# Patient Record
Sex: FEMALE | Race: WHITE | NOT HISPANIC OR LATINO | Employment: FULL TIME | URBAN - METROPOLITAN AREA
[De-identification: names, ages, dates, MRNs, and addresses within clinical notes are randomized per-mention and may not be internally consistent; named-entity substitution may affect disease eponyms.]

---

## 2018-11-28 ENCOUNTER — APPOINTMENT (OUTPATIENT)
Dept: GENERAL RADIOLOGY | Facility: HOSPITAL | Age: 53
End: 2018-11-28

## 2018-11-28 ENCOUNTER — HOSPITAL ENCOUNTER (INPATIENT)
Facility: HOSPITAL | Age: 53
LOS: 2 days | Discharge: HOME | End: 2018-11-30
Attending: EMERGENCY MEDICINE | Admitting: HOSPITALIST

## 2018-11-28 ENCOUNTER — APPOINTMENT (OUTPATIENT)
Dept: CT IMAGING | Facility: HOSPITAL | Age: 53
End: 2018-11-28

## 2018-11-28 DIAGNOSIS — J44.1 COPD EXACERBATION (HCC): Primary | ICD-10-CM

## 2018-11-28 DIAGNOSIS — R91.8 PULMONARY NODULES: ICD-10-CM

## 2018-11-28 DIAGNOSIS — J06.9 UPPER RESPIRATORY TRACT INFECTION, UNSPECIFIED TYPE: ICD-10-CM

## 2018-11-28 DIAGNOSIS — R09.02 HYPOXIA: ICD-10-CM

## 2018-11-28 LAB
ALBUMIN SERPL-MCNC: 3.9 G/DL (ref 3.5–5.2)
ALBUMIN/GLOB SERPL: 1.1 G/DL
ALP SERPL-CCNC: 58 U/L (ref 40–129)
ALT SERPL W P-5'-P-CCNC: 10 U/L (ref 5–33)
ANION GAP SERPL CALCULATED.3IONS-SCNC: 12.7 MMOL/L
AST SERPL-CCNC: 13 U/L (ref 5–32)
BASOPHILS # BLD AUTO: 0.05 10*3/MM3 (ref 0–0.2)
BASOPHILS NFR BLD AUTO: 0.7 % (ref 0–2)
BILIRUB SERPL-MCNC: 0.5 MG/DL (ref 0.2–1.2)
BUN BLD-MCNC: 7 MG/DL (ref 6–20)
BUN/CREAT SERPL: 13.5 (ref 7–25)
CALCIUM SPEC-SCNC: 9.1 MG/DL (ref 8.6–10.5)
CHLORIDE SERPL-SCNC: 98 MMOL/L (ref 98–107)
CO2 SERPL-SCNC: 27.3 MMOL/L (ref 22–29)
CREAT BLD-MCNC: 0.52 MG/DL (ref 0.57–1)
D DIMER PPP FEU-MCNC: 0.69 MCGFEU/ML (ref 0–0.46)
DEPRECATED RDW RBC AUTO: 42.4 FL (ref 37–54)
EOSINOPHIL # BLD AUTO: 0.12 10*3/MM3 (ref 0.1–0.3)
EOSINOPHIL NFR BLD AUTO: 1.6 % (ref 0–4)
ERYTHROCYTE [DISTWIDTH] IN BLOOD BY AUTOMATED COUNT: 12.7 % (ref 11.5–14.5)
GFR SERPL CREATININE-BSD FRML MDRD: 124 ML/MIN/1.73
GLOBULIN UR ELPH-MCNC: 3.5 GM/DL
GLUCOSE BLD-MCNC: 101 MG/DL (ref 65–99)
HCT VFR BLD AUTO: 48.2 % (ref 37–47)
HGB BLD-MCNC: 16.3 G/DL (ref 12–16)
IMM GRANULOCYTES # BLD: 0.01 10*3/MM3 (ref 0–0.03)
IMM GRANULOCYTES NFR BLD: 0.1 % (ref 0–0.5)
LYMPHOCYTES # BLD AUTO: 2.38 10*3/MM3 (ref 0.6–4.8)
LYMPHOCYTES NFR BLD AUTO: 32.2 % (ref 20–45)
MAGNESIUM SERPL-MCNC: 2.1 MG/DL (ref 1.7–2.5)
MCH RBC QN AUTO: 30.6 PG (ref 27–31)
MCHC RBC AUTO-ENTMCNC: 33.8 G/DL (ref 31–37)
MCV RBC AUTO: 90.6 FL (ref 81–99)
MONOCYTES # BLD AUTO: 1.12 10*3/MM3 (ref 0–1)
MONOCYTES NFR BLD AUTO: 15.2 % (ref 3–8)
NEUTROPHILS # BLD AUTO: 3.7 10*3/MM3 (ref 1.5–8.3)
NEUTROPHILS NFR BLD AUTO: 50.2 % (ref 45–70)
NRBC BLD MANUAL-RTO: 0 /100 WBC (ref 0–0)
NT-PROBNP SERPL-MCNC: 52.4 PG/ML (ref 5–125)
PLATELET # BLD AUTO: 167 10*3/MM3 (ref 140–500)
PMV BLD AUTO: 8.8 FL (ref 7.4–10.4)
POTASSIUM BLD-SCNC: 3.3 MMOL/L (ref 3.5–5.2)
PROCALCITONIN SERPL-MCNC: <0.02 NG/ML (ref 0.1–0.25)
PROT SERPL-MCNC: 7.4 G/DL (ref 6–8.5)
RBC # BLD AUTO: 5.32 10*6/MM3 (ref 4.2–5.4)
SODIUM BLD-SCNC: 138 MMOL/L (ref 136–145)
TROPONIN T SERPL-MCNC: <0.01 NG/ML (ref 0–0.03)
WBC NRBC COR # BLD: 7.38 10*3/MM3 (ref 4.8–10.8)

## 2018-11-28 PROCEDURE — 94799 UNLISTED PULMONARY SVC/PX: CPT

## 2018-11-28 PROCEDURE — 25010000002 AZITHROMYCIN: Performed by: EMERGENCY MEDICINE

## 2018-11-28 PROCEDURE — 87633 RESP VIRUS 12-25 TARGETS: CPT | Performed by: HOSPITALIST

## 2018-11-28 PROCEDURE — 80053 COMPREHEN METABOLIC PANEL: CPT | Performed by: EMERGENCY MEDICINE

## 2018-11-28 PROCEDURE — 25010000002 ENOXAPARIN PER 10 MG: Performed by: HOSPITALIST

## 2018-11-28 PROCEDURE — 84484 ASSAY OF TROPONIN QUANT: CPT | Performed by: EMERGENCY MEDICINE

## 2018-11-28 PROCEDURE — 87070 CULTURE OTHR SPECIMN AEROBIC: CPT | Performed by: HOSPITALIST

## 2018-11-28 PROCEDURE — 87798 DETECT AGENT NOS DNA AMP: CPT | Performed by: HOSPITALIST

## 2018-11-28 PROCEDURE — 85379 FIBRIN DEGRADATION QUANT: CPT | Performed by: EMERGENCY MEDICINE

## 2018-11-28 PROCEDURE — 71046 X-RAY EXAM CHEST 2 VIEWS: CPT

## 2018-11-28 PROCEDURE — 0 IOPAMIDOL PER 1 ML: Performed by: EMERGENCY MEDICINE

## 2018-11-28 PROCEDURE — 99284 EMERGENCY DEPT VISIT MOD MDM: CPT | Performed by: EMERGENCY MEDICINE

## 2018-11-28 PROCEDURE — 85025 COMPLETE CBC W/AUTO DIFF WBC: CPT | Performed by: EMERGENCY MEDICINE

## 2018-11-28 PROCEDURE — 87205 SMEAR GRAM STAIN: CPT | Performed by: HOSPITALIST

## 2018-11-28 PROCEDURE — 84145 PROCALCITONIN (PCT): CPT | Performed by: HOSPITALIST

## 2018-11-28 PROCEDURE — 87581 M.PNEUMON DNA AMP PROBE: CPT | Performed by: HOSPITALIST

## 2018-11-28 PROCEDURE — 93005 ELECTROCARDIOGRAM TRACING: CPT | Performed by: EMERGENCY MEDICINE

## 2018-11-28 PROCEDURE — 87486 CHLMYD PNEUM DNA AMP PROBE: CPT | Performed by: HOSPITALIST

## 2018-11-28 PROCEDURE — 83880 ASSAY OF NATRIURETIC PEPTIDE: CPT | Performed by: EMERGENCY MEDICINE

## 2018-11-28 PROCEDURE — 99222 1ST HOSP IP/OBS MODERATE 55: CPT | Performed by: HOSPITALIST

## 2018-11-28 PROCEDURE — 93010 ELECTROCARDIOGRAM REPORT: CPT | Performed by: INTERNAL MEDICINE

## 2018-11-28 PROCEDURE — 94640 AIRWAY INHALATION TREATMENT: CPT

## 2018-11-28 PROCEDURE — 71275 CT ANGIOGRAPHY CHEST: CPT

## 2018-11-28 PROCEDURE — 25010000002 METHYLPREDNISOLONE PER 40 MG: Performed by: HOSPITALIST

## 2018-11-28 PROCEDURE — 83735 ASSAY OF MAGNESIUM: CPT | Performed by: HOSPITALIST

## 2018-11-28 PROCEDURE — 99285 EMERGENCY DEPT VISIT HI MDM: CPT

## 2018-11-28 PROCEDURE — 25010000002 METHYLPREDNISOLONE PER 125 MG: Performed by: EMERGENCY MEDICINE

## 2018-11-28 RX ORDER — POTASSIUM CHLORIDE 7.45 MG/ML
10 INJECTION INTRAVENOUS
Status: DISCONTINUED | OUTPATIENT
Start: 2018-11-28 | End: 2018-11-30

## 2018-11-28 RX ORDER — IPRATROPIUM BROMIDE AND ALBUTEROL SULFATE 2.5; .5 MG/3ML; MG/3ML
3 SOLUTION RESPIRATORY (INHALATION) ONCE
Status: COMPLETED | OUTPATIENT
Start: 2018-11-28 | End: 2018-11-28

## 2018-11-28 RX ORDER — ACETAMINOPHEN 325 MG/1
650 TABLET ORAL EVERY 4 HOURS PRN
Status: DISCONTINUED | OUTPATIENT
Start: 2018-11-28 | End: 2018-11-30 | Stop reason: HOSPADM

## 2018-11-28 RX ORDER — POTASSIUM CHLORIDE 1.5 G/1.77G
40 POWDER, FOR SOLUTION ORAL AS NEEDED
Status: DISCONTINUED | OUTPATIENT
Start: 2018-11-28 | End: 2018-11-30

## 2018-11-28 RX ORDER — SODIUM CHLORIDE 9 MG/ML
40 INJECTION, SOLUTION INTRAVENOUS AS NEEDED
Status: DISCONTINUED | OUTPATIENT
Start: 2018-11-28 | End: 2018-11-30

## 2018-11-28 RX ORDER — METHYLPREDNISOLONE SODIUM SUCCINATE 40 MG/ML
40 INJECTION, POWDER, LYOPHILIZED, FOR SOLUTION INTRAMUSCULAR; INTRAVENOUS EVERY 6 HOURS
Status: DISCONTINUED | OUTPATIENT
Start: 2018-11-28 | End: 2018-11-28

## 2018-11-28 RX ORDER — ALBUTEROL SULFATE 2.5 MG/3ML
2.5 SOLUTION RESPIRATORY (INHALATION) ONCE
Status: COMPLETED | OUTPATIENT
Start: 2018-11-28 | End: 2018-11-28

## 2018-11-28 RX ORDER — METHYLPREDNISOLONE SODIUM SUCCINATE 125 MG/2ML
125 INJECTION, POWDER, LYOPHILIZED, FOR SOLUTION INTRAMUSCULAR; INTRAVENOUS ONCE
Status: COMPLETED | OUTPATIENT
Start: 2018-11-28 | End: 2018-11-28

## 2018-11-28 RX ORDER — MAGNESIUM SULFATE 1 G/100ML
1 INJECTION INTRAVENOUS AS NEEDED
Status: DISCONTINUED | OUTPATIENT
Start: 2018-11-28 | End: 2018-11-30

## 2018-11-28 RX ORDER — MAGNESIUM SULFATE HEPTAHYDRATE 40 MG/ML
4 INJECTION, SOLUTION INTRAVENOUS AS NEEDED
Status: DISCONTINUED | OUTPATIENT
Start: 2018-11-28 | End: 2018-11-30

## 2018-11-28 RX ORDER — AZITHROMYCIN 250 MG/1
500 TABLET, FILM COATED ORAL
Status: DISCONTINUED | OUTPATIENT
Start: 2018-11-29 | End: 2018-11-30 | Stop reason: HOSPADM

## 2018-11-28 RX ORDER — IPRATROPIUM BROMIDE AND ALBUTEROL SULFATE 2.5; .5 MG/3ML; MG/3ML
3 SOLUTION RESPIRATORY (INHALATION)
Status: DISCONTINUED | OUTPATIENT
Start: 2018-11-28 | End: 2018-11-30 | Stop reason: HOSPADM

## 2018-11-28 RX ORDER — GUAIFENESIN 600 MG/1
600 TABLET, EXTENDED RELEASE ORAL EVERY 12 HOURS SCHEDULED
Status: DISCONTINUED | OUTPATIENT
Start: 2018-11-29 | End: 2018-11-30 | Stop reason: HOSPADM

## 2018-11-28 RX ORDER — POTASSIUM CHLORIDE 20 MEQ/1
40 TABLET, EXTENDED RELEASE ORAL AS NEEDED
Status: DISCONTINUED | OUTPATIENT
Start: 2018-11-28 | End: 2018-11-30

## 2018-11-28 RX ORDER — SODIUM CHLORIDE 0.9 % (FLUSH) 0.9 %
3 SYRINGE (ML) INJECTION EVERY 12 HOURS SCHEDULED
Status: DISCONTINUED | OUTPATIENT
Start: 2018-11-28 | End: 2018-11-30

## 2018-11-28 RX ORDER — SODIUM CHLORIDE 0.9 % (FLUSH) 0.9 %
3-10 SYRINGE (ML) INJECTION AS NEEDED
Status: DISCONTINUED | OUTPATIENT
Start: 2018-11-28 | End: 2018-11-30

## 2018-11-28 RX ORDER — MAGNESIUM SULFATE HEPTAHYDRATE 40 MG/ML
2 INJECTION, SOLUTION INTRAVENOUS AS NEEDED
Status: DISCONTINUED | OUTPATIENT
Start: 2018-11-28 | End: 2018-11-30

## 2018-11-28 RX ADMIN — ALBUTEROL SULFATE 2.5 MG: 2.5 SOLUTION RESPIRATORY (INHALATION) at 15:39

## 2018-11-28 RX ADMIN — IPRATROPIUM BROMIDE AND ALBUTEROL SULFATE 3 ML: .5; 3 SOLUTION RESPIRATORY (INHALATION) at 23:27

## 2018-11-28 RX ADMIN — AZITHROMYCIN MONOHYDRATE 500 MG: 500 INJECTION, POWDER, LYOPHILIZED, FOR SOLUTION INTRAVENOUS at 18:42

## 2018-11-28 RX ADMIN — METHYLPREDNISOLONE SODIUM SUCCINATE 40 MG: 40 INJECTION, POWDER, FOR SOLUTION INTRAMUSCULAR; INTRAVENOUS at 21:14

## 2018-11-28 RX ADMIN — SODIUM CHLORIDE 1000 ML: 9 INJECTION, SOLUTION INTRAVENOUS at 16:21

## 2018-11-28 RX ADMIN — IOPAMIDOL 100 ML: 755 INJECTION, SOLUTION INTRAVENOUS at 17:24

## 2018-11-28 RX ADMIN — METHYLPREDNISOLONE SODIUM SUCCINATE 125 MG: 125 INJECTION, POWDER, FOR SOLUTION INTRAMUSCULAR; INTRAVENOUS at 15:18

## 2018-11-28 RX ADMIN — ALBUTEROL SULFATE 2.5 MG: 2.5 SOLUTION RESPIRATORY (INHALATION) at 16:21

## 2018-11-28 RX ADMIN — IPRATROPIUM BROMIDE AND ALBUTEROL SULFATE 3 ML: .5; 3 SOLUTION RESPIRATORY (INHALATION) at 15:04

## 2018-11-28 RX ADMIN — SODIUM CHLORIDE, PRESERVATIVE FREE 3 ML: 5 INJECTION INTRAVENOUS at 21:14

## 2018-11-28 RX ADMIN — ENOXAPARIN SODIUM 40 MG: 40 INJECTION SUBCUTANEOUS at 21:13

## 2018-11-28 RX ADMIN — IPRATROPIUM BROMIDE AND ALBUTEROL SULFATE 3 ML: .5; 3 SOLUTION RESPIRATORY (INHALATION) at 20:44

## 2018-11-29 LAB
ANION GAP SERPL CALCULATED.3IONS-SCNC: 14.6 MMOL/L
B PERT DNA SPEC QL NAA+PROBE: NOT DETECTED
BASOPHILS # BLD AUTO: 0 10*3/MM3 (ref 0–0.2)
BASOPHILS NFR BLD AUTO: 0 % (ref 0–2)
BUN BLD-MCNC: 9 MG/DL (ref 6–20)
BUN/CREAT SERPL: 17 (ref 7–25)
C PNEUM DNA NPH QL NAA+NON-PROBE: NOT DETECTED
CALCIUM SPEC-SCNC: 8.7 MG/DL (ref 8.6–10.5)
CHLORIDE SERPL-SCNC: 103 MMOL/L (ref 98–107)
CO2 SERPL-SCNC: 22.4 MMOL/L (ref 22–29)
CREAT BLD-MCNC: 0.53 MG/DL (ref 0.57–1)
DEPRECATED RDW RBC AUTO: 42.4 FL (ref 37–54)
EOSINOPHIL # BLD AUTO: 0 10*3/MM3 (ref 0.1–0.3)
EOSINOPHIL NFR BLD AUTO: 0 % (ref 0–4)
ERYTHROCYTE [DISTWIDTH] IN BLOOD BY AUTOMATED COUNT: 12.6 % (ref 11.5–14.5)
FLUAV H1 2009 PAND RNA NPH QL NAA+PROBE: NOT DETECTED
FLUAV H1 HA GENE NPH QL NAA+PROBE: NOT DETECTED
FLUAV H3 RNA NPH QL NAA+PROBE: NOT DETECTED
FLUAV SUBTYP SPEC NAA+PROBE: NOT DETECTED
FLUBV RNA ISLT QL NAA+PROBE: NOT DETECTED
GFR SERPL CREATININE-BSD FRML MDRD: 121 ML/MIN/1.73
GLUCOSE BLD-MCNC: 209 MG/DL (ref 65–99)
HADV DNA SPEC NAA+PROBE: NOT DETECTED
HCOV 229E RNA SPEC QL NAA+PROBE: NOT DETECTED
HCOV HKU1 RNA SPEC QL NAA+PROBE: NOT DETECTED
HCOV NL63 RNA SPEC QL NAA+PROBE: NOT DETECTED
HCOV OC43 RNA SPEC QL NAA+PROBE: DETECTED
HCT VFR BLD AUTO: 45.2 % (ref 37–47)
HGB BLD-MCNC: 14.7 G/DL (ref 12–16)
HMPV RNA NPH QL NAA+NON-PROBE: NOT DETECTED
HPIV1 RNA SPEC QL NAA+PROBE: NOT DETECTED
HPIV2 RNA SPEC QL NAA+PROBE: NOT DETECTED
HPIV3 RNA NPH QL NAA+PROBE: NOT DETECTED
HPIV4 P GENE NPH QL NAA+PROBE: NOT DETECTED
IMM GRANULOCYTES # BLD: 0.01 10*3/MM3 (ref 0–0.03)
IMM GRANULOCYTES NFR BLD: 0.3 % (ref 0–0.5)
LYMPHOCYTES # BLD AUTO: 0.71 10*3/MM3 (ref 0.6–4.8)
LYMPHOCYTES NFR BLD AUTO: 20.9 % (ref 20–45)
M PNEUMO IGG SER IA-ACNC: NOT DETECTED
MCH RBC QN AUTO: 29.8 PG (ref 27–31)
MCHC RBC AUTO-ENTMCNC: 32.5 G/DL (ref 31–37)
MCV RBC AUTO: 91.5 FL (ref 81–99)
MONOCYTES # BLD AUTO: 0.15 10*3/MM3 (ref 0–1)
MONOCYTES NFR BLD AUTO: 4.4 % (ref 3–8)
NEUTROPHILS # BLD AUTO: 2.53 10*3/MM3 (ref 1.5–8.3)
NEUTROPHILS NFR BLD AUTO: 74.4 % (ref 45–70)
NRBC BLD MANUAL-RTO: 0 /100 WBC (ref 0–0)
PLAT MORPH BLD: NORMAL
PLATELET # BLD AUTO: 159 10*3/MM3 (ref 140–500)
PMV BLD AUTO: 9.3 FL (ref 7.4–10.4)
POTASSIUM BLD-SCNC: 3.4 MMOL/L (ref 3.5–5.2)
POTASSIUM BLD-SCNC: 4 MMOL/L (ref 3.5–5.2)
RBC # BLD AUTO: 4.94 10*6/MM3 (ref 4.2–5.4)
RBC MORPH BLD: NORMAL
RHINOVIRUS RNA SPEC NAA+PROBE: NOT DETECTED
RSV RNA NPH QL NAA+NON-PROBE: NOT DETECTED
SODIUM BLD-SCNC: 140 MMOL/L (ref 136–145)
WBC MORPH BLD: NORMAL
WBC NRBC COR # BLD: 3.4 10*3/MM3 (ref 4.8–10.8)

## 2018-11-29 PROCEDURE — 63710000001 PREDNISONE PER 5 MG: Performed by: HOSPITALIST

## 2018-11-29 PROCEDURE — 94799 UNLISTED PULMONARY SVC/PX: CPT

## 2018-11-29 PROCEDURE — 85025 COMPLETE CBC W/AUTO DIFF WBC: CPT | Performed by: HOSPITALIST

## 2018-11-29 PROCEDURE — 84132 ASSAY OF SERUM POTASSIUM: CPT | Performed by: HOSPITALIST

## 2018-11-29 PROCEDURE — 99232 SBSQ HOSP IP/OBS MODERATE 35: CPT | Performed by: HOSPITALIST

## 2018-11-29 PROCEDURE — 63710000001 PREDNISONE PER 1 MG: Performed by: HOSPITALIST

## 2018-11-29 PROCEDURE — 80048 BASIC METABOLIC PNL TOTAL CA: CPT | Performed by: HOSPITALIST

## 2018-11-29 PROCEDURE — 85007 BL SMEAR W/DIFF WBC COUNT: CPT | Performed by: HOSPITALIST

## 2018-11-29 RX ADMIN — SODIUM CHLORIDE, PRESERVATIVE FREE 3 ML: 5 INJECTION INTRAVENOUS at 20:10

## 2018-11-29 RX ADMIN — GUAIFENESIN 600 MG: 600 TABLET, EXTENDED RELEASE ORAL at 00:29

## 2018-11-29 RX ADMIN — PREDNISONE 30 MG: 20 TABLET ORAL at 08:57

## 2018-11-29 RX ADMIN — IPRATROPIUM BROMIDE AND ALBUTEROL SULFATE 3 ML: .5; 3 SOLUTION RESPIRATORY (INHALATION) at 07:24

## 2018-11-29 RX ADMIN — GUAIFENESIN 600 MG: 600 TABLET, EXTENDED RELEASE ORAL at 20:00

## 2018-11-29 RX ADMIN — IPRATROPIUM BROMIDE AND ALBUTEROL SULFATE 3 ML: .5; 3 SOLUTION RESPIRATORY (INHALATION) at 15:31

## 2018-11-29 RX ADMIN — GUAIFENESIN 600 MG: 600 TABLET, EXTENDED RELEASE ORAL at 08:57

## 2018-11-29 RX ADMIN — PREDNISONE 30 MG: 20 TABLET ORAL at 18:08

## 2018-11-29 RX ADMIN — IPRATROPIUM BROMIDE AND ALBUTEROL SULFATE 3 ML: .5; 3 SOLUTION RESPIRATORY (INHALATION) at 11:25

## 2018-11-29 RX ADMIN — POTASSIUM CHLORIDE 40 MEQ: 1500 TABLET, EXTENDED RELEASE ORAL at 00:29

## 2018-11-29 RX ADMIN — IPRATROPIUM BROMIDE AND ALBUTEROL SULFATE 3 ML: .5; 3 SOLUTION RESPIRATORY (INHALATION) at 19:30

## 2018-11-29 RX ADMIN — POTASSIUM CHLORIDE 40 MEQ: 1500 TABLET, EXTENDED RELEASE ORAL at 04:18

## 2018-11-29 RX ADMIN — IPRATROPIUM BROMIDE AND ALBUTEROL SULFATE 3 ML: .5; 3 SOLUTION RESPIRATORY (INHALATION) at 03:05

## 2018-11-29 RX ADMIN — SODIUM CHLORIDE, PRESERVATIVE FREE 3 ML: 5 INJECTION INTRAVENOUS at 08:57

## 2018-11-29 RX ADMIN — AZITHROMYCIN MONOHYDRATE 500 MG: 250 TABLET ORAL at 08:57

## 2018-11-29 RX ADMIN — IPRATROPIUM BROMIDE AND ALBUTEROL SULFATE 3 ML: .5; 3 SOLUTION RESPIRATORY (INHALATION) at 23:22

## 2018-11-30 VITALS
TEMPERATURE: 97.8 F | HEART RATE: 96 BPM | SYSTOLIC BLOOD PRESSURE: 98 MMHG | BODY MASS INDEX: 25.87 KG/M2 | DIASTOLIC BLOOD PRESSURE: 62 MMHG | WEIGHT: 140.56 LBS | OXYGEN SATURATION: 93 % | HEIGHT: 62 IN | RESPIRATION RATE: 20 BRPM

## 2018-11-30 PROCEDURE — 94618 PULMONARY STRESS TESTING: CPT

## 2018-11-30 PROCEDURE — 25010000002 ENOXAPARIN PER 10 MG: Performed by: HOSPITALIST

## 2018-11-30 PROCEDURE — 94799 UNLISTED PULMONARY SVC/PX: CPT

## 2018-11-30 PROCEDURE — 63710000001 PREDNISONE PER 1 MG: Performed by: HOSPITALIST

## 2018-11-30 PROCEDURE — 63710000001 PREDNISONE PER 5 MG: Performed by: HOSPITALIST

## 2018-11-30 PROCEDURE — 99239 HOSP IP/OBS DSCHRG MGMT >30: CPT | Performed by: HOSPITALIST

## 2018-11-30 RX ORDER — PREDNISONE 10 MG/1
20 TABLET ORAL 2 TIMES DAILY
Qty: 20 TABLET | Refills: 0 | Status: SHIPPED | OUTPATIENT
Start: 2018-11-30 | End: 2018-12-05

## 2018-11-30 RX ORDER — AZITHROMYCIN 250 MG/1
TABLET, FILM COATED ORAL
Qty: 6 TABLET | Refills: 0 | Status: SHIPPED | OUTPATIENT
Start: 2018-12-01 | End: 2018-12-05

## 2018-11-30 RX ORDER — BUDESONIDE AND FORMOTEROL FUMARATE DIHYDRATE 160; 4.5 UG/1; UG/1
2 AEROSOL RESPIRATORY (INHALATION)
Qty: 1 INHALER | Refills: 0 | Status: ON HOLD | OUTPATIENT
Start: 2018-11-30 | End: 2019-09-11

## 2018-11-30 RX ORDER — ACETAMINOPHEN 325 MG/1
650 TABLET ORAL EVERY 4 HOURS PRN
Start: 2018-11-30

## 2018-11-30 RX ORDER — ALBUTEROL SULFATE 90 UG/1
2 AEROSOL, METERED RESPIRATORY (INHALATION) EVERY 4 HOURS PRN
Qty: 1 INHALER | Refills: 1 | Status: SHIPPED | OUTPATIENT
Start: 2018-11-30

## 2018-11-30 RX ORDER — SACCHAROMYCES BOULARDII 250 MG
500 CAPSULE ORAL 2 TIMES DAILY
Qty: 80 CAPSULE | Refills: 0 | Status: SHIPPED | OUTPATIENT
Start: 2018-11-30 | End: 2018-12-20

## 2018-11-30 RX ORDER — SACCHAROMYCES BOULARDII 250 MG
500 CAPSULE ORAL 2 TIMES DAILY
Status: DISCONTINUED | OUTPATIENT
Start: 2018-11-30 | End: 2018-11-30 | Stop reason: HOSPADM

## 2018-11-30 RX ORDER — GUAIFENESIN 600 MG/1
600 TABLET, EXTENDED RELEASE ORAL EVERY 12 HOURS SCHEDULED
Qty: 60 TABLET | Refills: 0 | Status: SHIPPED | OUTPATIENT
Start: 2018-11-30 | End: 2019-09-16

## 2018-11-30 RX ADMIN — IPRATROPIUM BROMIDE AND ALBUTEROL SULFATE 3 ML: .5; 3 SOLUTION RESPIRATORY (INHALATION) at 11:09

## 2018-11-30 RX ADMIN — ENOXAPARIN SODIUM 40 MG: 40 INJECTION SUBCUTANEOUS at 06:23

## 2018-11-30 RX ADMIN — IPRATROPIUM BROMIDE AND ALBUTEROL SULFATE 3 ML: .5; 3 SOLUTION RESPIRATORY (INHALATION) at 03:24

## 2018-11-30 RX ADMIN — IPRATROPIUM BROMIDE AND ALBUTEROL SULFATE 3 ML: .5; 3 SOLUTION RESPIRATORY (INHALATION) at 06:55

## 2018-11-30 RX ADMIN — AZITHROMYCIN MONOHYDRATE 500 MG: 250 TABLET ORAL at 09:54

## 2018-11-30 RX ADMIN — Medication 500 MG: at 14:11

## 2018-11-30 RX ADMIN — GUAIFENESIN 600 MG: 600 TABLET, EXTENDED RELEASE ORAL at 09:54

## 2018-11-30 RX ADMIN — PREDNISONE 30 MG: 20 TABLET ORAL at 09:54

## 2018-11-30 RX ADMIN — IPRATROPIUM BROMIDE AND ALBUTEROL SULFATE 3 ML: .5; 3 SOLUTION RESPIRATORY (INHALATION) at 15:11

## 2018-12-01 ENCOUNTER — READMISSION MANAGEMENT (OUTPATIENT)
Dept: CALL CENTER | Facility: HOSPITAL | Age: 53
End: 2018-12-01

## 2018-12-01 LAB
BACTERIA SPEC RESP CULT: NORMAL
GRAM STN SPEC: NORMAL
GRAM STN SPEC: NORMAL

## 2018-12-01 NOTE — OUTREACH NOTE
Prep Survey      Responses   Facility patient discharged from?  [facility]   Is LACE score < 7 ?  No   Is patient eligible?  Yes   Discharge diagnosis  Acute coronavirus bronchitis, emphysema, AE COPD, hypokalemia, tobacco abuse   Does the patient have one of the following disease processes/diagnoses(primary or secondary)?  COPD/Pneumonia   Does the patient have Home health ordered?  No   Is there a DME ordered?  Yes   What DME was ordered?  [place] for home O2   Comments regarding appointments  See AVS   Prep survey completed?  Yes          Nanci Eli RN

## 2018-12-03 ENCOUNTER — READMISSION MANAGEMENT (OUTPATIENT)
Dept: CALL CENTER | Facility: HOSPITAL | Age: 53
End: 2018-12-03

## 2018-12-03 NOTE — OUTREACH NOTE
COPD/PN Week 1 Survey      Responses   Facility patient discharged from?  [facility]   Does the patient have one of the following disease processes/diagnoses(primary or secondary)?  COPD/Pneumonia   Is there a successful TCM telephone encounter documented?  No   Was the primary reason for admission:  COPD exacerbation   Week 1 attempt successful?  Yes   Call start time  0807   Unsuccessful attempts  Attempt 1   Call end time  0820   Discharge diagnosis  Acute coronavirus bronchitis, emphysema, AE COPD, hypokalemia, tobacco abuse   Is patient permission given to speak with other caregiver?  No   Meds reviewed with patient/caregiver?  Yes   Is the patient having any side effects they believe may be caused by any medication additions or changes?  No   Does the patient have all medications ordered at discharge?  Yes   Is the patient taking all medications as directed (includes completed medication regime)?  Yes   Does the patient have a primary care provider?   Yes   What DME was ordered?  [place] for home O2   Psychosocial issues?  No   Comments  Email sent to case management re: work excuse D/C MD told her one would be written for one and a half week, but when she got home she realized she didn't have one.     Did the patient receive a copy of their discharge instructions?  Yes   Nursing interventions  Reviewed instructions with patient, Educated on MyChart   What is the patient's perception of their health status since discharge?  Improving   Nursing Interventions  Nurse provided patient education   Are the patient's immunizations up to date?   No   Nursing interventions  Educated on importance of maintaining up to date immunizations as advised by provider, Advised patient to discuss with provider at next visit   If the patient is a current smoker, are they able to teach back resources for cessation?  Smoking cessation medications, Smoking cessation support groups, Smoking cessation classes, [id number] [Has not  "smoked, \"cold turkey\"]   Is the patient/caregiver able to teach back the hierarchy of who to call/visit for symptoms/problems? PCP, Specialist, Home health nurse, Urgent Care, ED, 911  Yes   Is the patient able to teach back COPD zones?  Yes   Nursing interventions  Education provided on various zones   Patient reports what zone on this call?  Yellow Zone   Yellow interventions  Continue to use daily medications, Use oxygen as ordered, Use other meds such as steroids or antibiotics as ordered, Do not smoke, Get plenty of rest   Week 1 call completed?  Yes          Britta Rosario RN  "

## 2018-12-10 ENCOUNTER — READMISSION MANAGEMENT (OUTPATIENT)
Dept: CALL CENTER | Facility: HOSPITAL | Age: 53
End: 2018-12-10

## 2018-12-10 NOTE — OUTREACH NOTE
COPD/PN Week 2 Survey      Responses   Facility patient discharged from?  [facility]   Does the patient have one of the following disease processes/diagnoses(primary or secondary)?  COPD/Pneumonia   Was the primary reason for admission:  COPD exacerbation   Week 2 attempt successful?  Yes   Call start time  1610   Call end time  1618   Discharge diagnosis  Acute coronavirus bronchitis, emphysema, AE COPD, hypokalemia, tobacco abuse   Meds reviewed with patient/caregiver?  Yes   Is the patient taking all medications as directed (includes completed medication regime)?  Yes   Does the patient have an appointment with their PCP or pulmonologist within 7 days of discharge?  Yes   Has the patient kept scheduled appointments due by today?  Yes   Comments  12/10 PCP             Pulmonologist has not called her with appt date.  Advised patient to call Pulmonologist tomorrow for appt date and PFT's.   What DME was ordered?  Has been off the oxygen for about 2 hours doing well. No dyspnea. O2 sat 95-96%   Psychosocial issues?  No   Comments  Patient has FMLA papers to be completed.  Appt with PCP today and she would not complete them.  Advised to follow up with pulmonary MD.   What is the patient's perception of their health status since discharge?  Improving   Nursing Interventions  Nurse provided patient education, Advised patient to call provider   Is the patient able to teach back COPD zones?  Yes   Patient reports what zone on this call?  Green Zone   Green Zone  Reports doing well   Green Zone interventions:  Take daily medications   Week 2 call completed?  Yes          Britta Rosario RN

## 2018-12-19 ENCOUNTER — TRANSCRIBE ORDERS (OUTPATIENT)
Dept: ADMINISTRATIVE | Facility: HOSPITAL | Age: 53
End: 2018-12-19

## 2018-12-19 DIAGNOSIS — R91.8 LUNG NODULES: Primary | ICD-10-CM

## 2019-05-15 ENCOUNTER — TRANSCRIBE ORDERS (OUTPATIENT)
Dept: ADMINISTRATIVE | Facility: HOSPITAL | Age: 54
End: 2019-05-15

## 2019-05-15 DIAGNOSIS — R22.1 NECK MASS: Primary | ICD-10-CM

## 2019-05-16 ENCOUNTER — HOSPITAL ENCOUNTER (OUTPATIENT)
Dept: ULTRASOUND IMAGING | Facility: HOSPITAL | Age: 54
Discharge: HOME | End: 2019-05-16
Admitting: NURSE PRACTITIONER

## 2019-05-16 DIAGNOSIS — R22.1 NECK MASS: ICD-10-CM

## 2019-05-16 PROCEDURE — 76999 ECHO EXAMINATION PROCEDURE: CPT

## 2019-06-12 ENCOUNTER — HOSPITAL ENCOUNTER (OUTPATIENT)
Dept: CT IMAGING | Facility: HOSPITAL | Age: 54
Discharge: HOME | End: 2019-06-12
Attending: INTERNAL MEDICINE | Admitting: INTERNAL MEDICINE

## 2019-06-12 DIAGNOSIS — R91.8 LUNG NODULES: ICD-10-CM

## 2019-06-12 PROCEDURE — 71250 CT THORAX DX C-: CPT

## 2019-06-27 ENCOUNTER — TRANSCRIBE ORDERS (OUTPATIENT)
Dept: ADMINISTRATIVE | Facility: HOSPITAL | Age: 54
End: 2019-06-27

## 2019-06-27 DIAGNOSIS — J44.9 CHRONIC OBSTRUCTIVE PULMONARY DISEASE, UNSPECIFIED COPD TYPE (HCC): Primary | ICD-10-CM

## 2019-07-16 ENCOUNTER — TRANSCRIBE ORDERS (OUTPATIENT)
Dept: ADMINISTRATIVE | Facility: HOSPITAL | Age: 54
End: 2019-07-16

## 2019-07-16 DIAGNOSIS — R22.1 NECK MASS: Primary | ICD-10-CM

## 2019-07-16 DIAGNOSIS — R10.84 GENERALIZED ABDOMINAL PAIN: ICD-10-CM

## 2019-07-16 DIAGNOSIS — E03.9 PRIMARY HYPOTHYROIDISM: ICD-10-CM

## 2019-07-18 ENCOUNTER — HOSPITAL ENCOUNTER (OUTPATIENT)
Dept: CT IMAGING | Facility: HOSPITAL | Age: 54
Discharge: HOME | End: 2019-07-18
Admitting: NURSE PRACTITIONER

## 2019-07-18 DIAGNOSIS — R22.1 NECK MASS: ICD-10-CM

## 2019-07-18 PROCEDURE — 25010000002 IOPAMIDOL 61 % SOLUTION: Performed by: NURSE PRACTITIONER

## 2019-07-18 PROCEDURE — 70491 CT SOFT TISSUE NECK W/DYE: CPT

## 2019-07-18 RX ADMIN — IOPAMIDOL 100 ML: 612 INJECTION, SOLUTION INTRAVENOUS at 10:57

## 2019-07-22 ENCOUNTER — HOSPITAL ENCOUNTER (OUTPATIENT)
Dept: ULTRASOUND IMAGING | Facility: HOSPITAL | Age: 54
Discharge: HOME | End: 2019-07-22
Admitting: NURSE PRACTITIONER

## 2019-07-22 ENCOUNTER — HOSPITAL ENCOUNTER (OUTPATIENT)
Dept: ULTRASOUND IMAGING | Facility: HOSPITAL | Age: 54
Discharge: HOME | End: 2019-07-22

## 2019-07-22 DIAGNOSIS — R10.84 GENERALIZED ABDOMINAL PAIN: ICD-10-CM

## 2019-07-22 DIAGNOSIS — E03.9 PRIMARY HYPOTHYROIDISM: ICD-10-CM

## 2019-07-22 PROCEDURE — 76705 ECHO EXAM OF ABDOMEN: CPT

## 2019-07-22 PROCEDURE — 76536 US EXAM OF HEAD AND NECK: CPT

## 2019-08-02 ENCOUNTER — TRANSCRIBE ORDERS (OUTPATIENT)
Dept: ADMINISTRATIVE | Facility: HOSPITAL | Age: 54
End: 2019-08-02

## 2019-08-02 DIAGNOSIS — J32.9 CHRONIC SINUSITIS, UNSPECIFIED LOCATION: Primary | ICD-10-CM

## 2019-08-15 ENCOUNTER — HOSPITAL ENCOUNTER (EMERGENCY)
Facility: HOSPITAL | Age: 54
Discharge: HOME | End: 2019-08-15
Attending: EMERGENCY MEDICINE | Admitting: EMERGENCY MEDICINE

## 2019-08-15 VITALS
BODY MASS INDEX: 28.71 KG/M2 | OXYGEN SATURATION: 98 % | WEIGHT: 156 LBS | TEMPERATURE: 97.7 F | HEIGHT: 62 IN | SYSTOLIC BLOOD PRESSURE: 134 MMHG | DIASTOLIC BLOOD PRESSURE: 65 MMHG | RESPIRATION RATE: 18 BRPM | HEART RATE: 78 BPM

## 2019-08-15 DIAGNOSIS — K80.20 CALCULUS OF GALLBLADDER WITHOUT CHOLECYSTITIS WITHOUT OBSTRUCTION: Primary | ICD-10-CM

## 2019-08-15 DIAGNOSIS — K80.50 BILIARY COLIC: ICD-10-CM

## 2019-08-15 LAB
ALBUMIN SERPL-MCNC: 3.9 G/DL (ref 3.5–5.2)
ALBUMIN/GLOB SERPL: 1.2 G/DL
ALP SERPL-CCNC: 58 U/L (ref 39–117)
ALT SERPL W P-5'-P-CCNC: 12 U/L (ref 1–33)
AMYLASE SERPL-CCNC: 40 U/L (ref 28–100)
ANION GAP SERPL CALCULATED.3IONS-SCNC: 10.8 MMOL/L (ref 5–15)
AST SERPL-CCNC: 11 U/L (ref 1–32)
BASOPHILS # BLD AUTO: 0.03 10*3/MM3 (ref 0–0.2)
BASOPHILS NFR BLD AUTO: 0.3 % (ref 0–1.5)
BILIRUB SERPL-MCNC: 0.4 MG/DL (ref 0.2–1.2)
BUN BLD-MCNC: 10 MG/DL (ref 6–20)
BUN/CREAT SERPL: 15.4 (ref 7–25)
CALCIUM SPEC-SCNC: 9.1 MG/DL (ref 8.6–10.5)
CHLORIDE SERPL-SCNC: 103 MMOL/L (ref 98–107)
CO2 SERPL-SCNC: 25.2 MMOL/L (ref 22–29)
CREAT BLD-MCNC: 0.65 MG/DL (ref 0.57–1)
DEPRECATED RDW RBC AUTO: 44.1 FL (ref 37–54)
EOSINOPHIL # BLD AUTO: 0.15 10*3/MM3 (ref 0–0.4)
EOSINOPHIL NFR BLD AUTO: 1.5 % (ref 0.3–6.2)
ERYTHROCYTE [DISTWIDTH] IN BLOOD BY AUTOMATED COUNT: 12.9 % (ref 12.3–15.4)
GFR SERPL CREATININE-BSD FRML MDRD: 96 ML/MIN/1.73
GLOBULIN UR ELPH-MCNC: 3.2 GM/DL
GLUCOSE BLD-MCNC: 113 MG/DL (ref 65–99)
HCT VFR BLD AUTO: 48.4 % (ref 34–46.6)
HGB BLD-MCNC: 15.5 G/DL (ref 12–15.9)
IMM GRANULOCYTES # BLD AUTO: 0.01 10*3/MM3 (ref 0–0.05)
IMM GRANULOCYTES NFR BLD AUTO: 0.1 % (ref 0–0.5)
LDH SERPL-CCNC: 174 U/L (ref 135–214)
LIPASE SERPL-CCNC: 17 U/L (ref 13–60)
LYMPHOCYTES # BLD AUTO: 1.75 10*3/MM3 (ref 0.7–3.1)
LYMPHOCYTES NFR BLD AUTO: 17.8 % (ref 19.6–45.3)
MCH RBC QN AUTO: 29.4 PG (ref 26.6–33)
MCHC RBC AUTO-ENTMCNC: 32 G/DL (ref 31.5–35.7)
MCV RBC AUTO: 91.8 FL (ref 79–97)
MONOCYTES # BLD AUTO: 0.54 10*3/MM3 (ref 0.1–0.9)
MONOCYTES NFR BLD AUTO: 5.5 % (ref 5–12)
NEUTROPHILS # BLD AUTO: 7.34 10*3/MM3 (ref 1.7–7)
NEUTROPHILS NFR BLD AUTO: 74.8 % (ref 42.7–76)
PLATELET # BLD AUTO: 225 10*3/MM3 (ref 140–450)
PMV BLD AUTO: 8.5 FL (ref 6–12)
POTASSIUM BLD-SCNC: 3.8 MMOL/L (ref 3.5–5.2)
PROT SERPL-MCNC: 7.1 G/DL (ref 6–8.5)
RBC # BLD AUTO: 5.27 10*6/MM3 (ref 3.77–5.28)
SODIUM BLD-SCNC: 139 MMOL/L (ref 136–145)
WBC NRBC COR # BLD: 9.82 10*3/MM3 (ref 3.4–10.8)

## 2019-08-15 PROCEDURE — 25010000002 ONDANSETRON PER 1 MG: Performed by: EMERGENCY MEDICINE

## 2019-08-15 PROCEDURE — 85025 COMPLETE CBC W/AUTO DIFF WBC: CPT | Performed by: EMERGENCY MEDICINE

## 2019-08-15 PROCEDURE — 83615 LACTATE (LD) (LDH) ENZYME: CPT | Performed by: EMERGENCY MEDICINE

## 2019-08-15 PROCEDURE — 83690 ASSAY OF LIPASE: CPT | Performed by: EMERGENCY MEDICINE

## 2019-08-15 PROCEDURE — 80053 COMPREHEN METABOLIC PANEL: CPT | Performed by: EMERGENCY MEDICINE

## 2019-08-15 PROCEDURE — 96374 THER/PROPH/DIAG INJ IV PUSH: CPT

## 2019-08-15 PROCEDURE — 96375 TX/PRO/DX INJ NEW DRUG ADDON: CPT

## 2019-08-15 PROCEDURE — 99283 EMERGENCY DEPT VISIT LOW MDM: CPT

## 2019-08-15 PROCEDURE — 99284 EMERGENCY DEPT VISIT MOD MDM: CPT | Performed by: EMERGENCY MEDICINE

## 2019-08-15 PROCEDURE — 82150 ASSAY OF AMYLASE: CPT | Performed by: EMERGENCY MEDICINE

## 2019-08-15 RX ORDER — ONDANSETRON 8 MG/1
TABLET, ORALLY DISINTEGRATING ORAL
Qty: 10 TABLET | Refills: 0 | Status: SHIPPED | OUTPATIENT
Start: 2019-08-15 | End: 2019-09-16

## 2019-08-15 RX ORDER — FEXOFENADINE HCL 180 MG/1
180 TABLET ORAL DAILY
COMMUNITY

## 2019-08-15 RX ORDER — SULFAMETHOXAZOLE AND TRIMETHOPRIM 800; 160 MG/1; MG/1
1 TABLET ORAL 2 TIMES DAILY
Status: ON HOLD | COMMUNITY
End: 2019-09-11

## 2019-08-15 RX ORDER — GLYCOPYRROLATE 1 MG/1
TABLET ORAL
Qty: 20 TABLET | Refills: 0 | Status: SHIPPED | OUTPATIENT
Start: 2019-08-15 | End: 2019-09-16

## 2019-08-15 RX ORDER — SODIUM CHLORIDE 0.9 % (FLUSH) 0.9 %
10 SYRINGE (ML) INJECTION AS NEEDED
Status: DISCONTINUED | OUTPATIENT
Start: 2019-08-15 | End: 2019-08-15 | Stop reason: HOSPADM

## 2019-08-15 RX ORDER — HYDROCODONE BITARTRATE AND ACETAMINOPHEN 5; 325 MG/1; MG/1
1-2 TABLET ORAL EVERY 4 HOURS PRN
Qty: 16 TABLET | Refills: 0 | Status: SHIPPED | OUTPATIENT
Start: 2019-08-15 | End: 2019-09-16

## 2019-08-15 RX ORDER — ONDANSETRON 2 MG/ML
4 INJECTION INTRAMUSCULAR; INTRAVENOUS ONCE
Status: COMPLETED | OUTPATIENT
Start: 2019-08-15 | End: 2019-08-15

## 2019-08-15 RX ORDER — GLYCOPYRROLATE 0.2 MG/ML
0.2 INJECTION INTRAMUSCULAR; INTRAVENOUS ONCE
Status: COMPLETED | OUTPATIENT
Start: 2019-08-15 | End: 2019-08-15

## 2019-08-15 RX ORDER — LEVOTHYROXINE SODIUM 0.03 MG/1
25 TABLET ORAL DAILY
COMMUNITY
End: 2019-09-16

## 2019-08-15 RX ADMIN — SODIUM CHLORIDE 1000 ML: 9 INJECTION, SOLUTION INTRAVENOUS at 11:13

## 2019-08-15 RX ADMIN — GLYCOPYRROLATE 0.2 MG: 0.2 INJECTION INTRAMUSCULAR; INTRAVENOUS at 11:12

## 2019-08-15 RX ADMIN — ONDANSETRON 4 MG: 2 INJECTION, SOLUTION INTRAMUSCULAR; INTRAVENOUS at 11:13

## 2019-08-22 ENCOUNTER — HOSPITAL ENCOUNTER (OUTPATIENT)
Dept: CT IMAGING | Facility: HOSPITAL | Age: 54
Discharge: HOME | End: 2019-08-22
Admitting: OTOLARYNGOLOGY

## 2019-08-22 DIAGNOSIS — J32.9 CHRONIC SINUSITIS, UNSPECIFIED LOCATION: ICD-10-CM

## 2019-08-22 PROCEDURE — 70486 CT MAXILLOFACIAL W/O DYE: CPT

## 2019-09-04 ENCOUNTER — OFFICE VISIT (OUTPATIENT)
Dept: SURGERY | Facility: CLINIC | Age: 54
End: 2019-09-04

## 2019-09-04 VITALS
RESPIRATION RATE: 18 BRPM | SYSTOLIC BLOOD PRESSURE: 128 MMHG | WEIGHT: 154 LBS | DIASTOLIC BLOOD PRESSURE: 70 MMHG | HEIGHT: 62 IN | BODY MASS INDEX: 28.34 KG/M2

## 2019-09-04 DIAGNOSIS — K42.9 UMBILICAL HERNIA WITHOUT OBSTRUCTION AND WITHOUT GANGRENE: ICD-10-CM

## 2019-09-04 DIAGNOSIS — K80.20 CALCULUS OF GALLBLADDER WITHOUT CHOLECYSTITIS WITHOUT OBSTRUCTION: Primary | ICD-10-CM

## 2019-09-04 PROCEDURE — 99203 OFFICE O/P NEW LOW 30 MIN: CPT | Performed by: SURGERY

## 2019-09-04 NOTE — H&P (VIEW-ONLY)
PATIENT INFORMATION  Irais Bolivar       - 1965    CHIEF COMPLAINT  Chief Complaint   Patient presents with   • Abdominal Pain   abdominal pain since November    HISTORY OF PRESENT ILLNESS  HPI she complains of a 1 year history of intermittent episodic epigastric and upper abdominal pain.  This is associated with eating and primarily greasy foods.  She has some chills but no fever she denies any jaundice type symptoms.  She says the pain has been increasing.  She went to the emergency room in August for this she had a prior ultrasound of her abdomen and lab work        REVIEW OF SYSTEMS  Review of Systems all other systems negative      ACTIVE PROBLEMS  Patient Active Problem List    Diagnosis   • COPD exacerbation (CMS/[facility]) [J44.1]         PAST MEDICAL HISTORY  Past Medical History:   Diagnosis Date   • Cancer (CMS/[facility])     cervical 16 yrs ago   • Cholelithiasis 2018   • COPD (chronic obstructive pulmonary disease) (CMS/[facility]) [id number]         SURGICAL HISTORY  Past Surgical History:   Procedure Laterality Date   • BLADDER REPAIR     • CHEST TUBE INSERTION      27 yrs ago   • DILATATION AND CURETTAGE     • HYSTERECTOMY           FAMILY HISTORY  Family History   Problem Relation Age of Onset   • Lung disease Mother    • Dementia Father    • No Known Problems Sister    • Asthma Daughter    • Cancer Daughter    • Miscarriages / Stillbirths Daughter    • No Known Problems Son    • Diabetes Maternal Grandmother    • Heart disease Maternal Grandmother    • No Known Problems Sister    • No Known Problems Sister    • No Known Problems Sister          SOCIAL HISTORY  Social History     Occupational History   • Not on file   Tobacco Use   • Smoking status: Former Smoker     Packs/day: 1.00     Years: 25.00     Pack years: 25.00     Types: Cigarettes     Last attempt to quit: 2018     Years since quittin.7   • Smokeless tobacco: Never Used   Substance and Sexual Activity   • Alcohol use: No      "Frequency: Never   • Drug use: No   • Sexual activity: Yes     Partners: Male     Birth control/protection: Other, None       Debilities/Disabilities Identified: None    Emotional Behavior: Appropriate    CURRENT MEDICATIONS    Current Outpatient Medications:   •  acetaminophen (TYLENOL) 325 MG tablet, Take 2 tablets by mouth Every 4 (Four) Hours As Needed for Mild Pain ., Disp: , Rfl:   •  albuterol 108 (90 Base) MCG/ACT inhaler, Inhale 2 puffs Every 4 (Four) Hours As Needed for Wheezing or Shortness of Air., Disp: 1 inhaler, Rfl: 1  •  budesonide-formoterol (SYMBICORT) 160-4.5 MCG/ACT inhaler, Inhale 2 puffs 2 (Two) Times a Day., Disp: 1 inhaler, Rfl: 0  •  fexofenadine (ALLEGRA) 180 MG tablet, Take 180 mg by mouth Daily., Disp: , Rfl:   •  glycopyrrolate (ROBINUL) 1 MG tablet, 1 tablet p.o. every 6 hours as needed abdominal pain, Disp: 20 tablet, Rfl: 0  •  guaiFENesin (MUCINEX) 600 MG 12 hr tablet, Take 1 tablet by mouth Every 12 (Twelve) Hours., Disp: 60 tablet, Rfl: 0  •  HYDROcodone-acetaminophen (NORCO) 5-325 MG per tablet, Take 1-2 tablets by mouth Every 4 (Four) Hours As Needed for Severe Pain ., Disp: 16 tablet, Rfl: 0  •  levothyroxine (SYNTHROID, LEVOTHROID) 25 MCG tablet, Take 25 mcg by mouth Daily., Disp: , Rfl:   •  ondansetron ODT (ZOFRAN-ODT) 8 MG disintegrating tablet, One tablet po q 6 hours PRN nausea and vomiting, Disp: 10 tablet, Rfl: 0  •  sulfamethoxazole-trimethoprim (BACTRIM DS,SEPTRA DS) 800-160 MG per tablet, Take 1 tablet by mouth 2 (Two) Times a Day., Disp: , Rfl:     ALLERGIES  Patient has no known allergies.    VITALS  Vitals:    09/04/19 1302   BP: 128/70   Resp: 18   Weight: 69.9 kg (154 lb)   Height: 157.5 cm (62\")       LAST RESULTS   Admission on 08/15/2019, Discharged on 08/15/2019   Component Date Value Ref Range Status   • Glucose 08/15/2019 113* 65 - 99 mg/dL Final   • BUN 08/15/2019 10  6 - 20 mg/dL Final   • Creatinine 08/15/2019 0.65  0.57 - 1.00 mg/dL Final   • Sodium " 08/15/2019 139  136 - 145 mmol/L Final   • Potassium 08/15/2019 3.8  3.5 - 5.2 mmol/L Final   • Chloride 08/15/2019 103  98 - 107 mmol/L Final   • CO2 08/15/2019 25.2  22.0 - 29.0 mmol/L Final   • Calcium 08/15/2019 9.1  8.6 - 10.5 mg/dL Final   • Total Protein 08/15/2019 7.1  6.0 - 8.5 g/dL Final   • Albumin 08/15/2019 3.90  3.50 - 5.20 g/dL Final   • ALT (SGPT) 08/15/2019 12  1 - 33 U/L Final   • AST (SGOT) 08/15/2019 11  1 - 32 U/L Final   • Alkaline Phosphatase 08/15/2019 58  39 - 117 U/L Final   • Total Bilirubin 08/15/2019 0.4  0.2 - 1.2 mg/dL Final   • eGFR Non African Amer 08/15/2019 96  >60 mL/min/1.73 Final   • Globulin 08/15/2019 3.2  gm/dL Final   • A/G Ratio 08/15/2019 1.2  g/dL Final   • BUN/Creatinine Ratio 08/15/2019 15.4  7.0 - 25.0 Final   • Anion Gap 08/15/2019 10.8  5.0 - 15.0 mmol/L Final   • Lipase 08/15/2019 17  13 - 60 U/L Final   • LDH 08/15/2019 174  135 - 214 U/L Final   • Amylase 08/15/2019 40  28 - 100 U/L Final   • WBC 08/15/2019 9.82  3.40 - 10.80 10*3/mm3 Final   • RBC 08/15/2019 5.27  3.77 - 5.28 10*6/mm3 Final   • Hemoglobin 08/15/2019 15.5  12.0 - 15.9 g/dL Final   • Hematocrit 08/15/2019 48.4* 34.0 - 46.6 % Final   • MCV 08/15/2019 91.8  79.0 - 97.0 fL Final   • MCH 08/15/2019 29.4  26.6 - 33.0 pg Final   • MCHC 08/15/2019 32.0  31.5 - 35.7 g/dL Final   • RDW 08/15/2019 12.9  12.3 - 15.4 % Final   • RDW-SD 08/15/2019 44.1  37.0 - 54.0 fl Final   • MPV 08/15/2019 8.5  6.0 - 12.0 fL Final   • Platelets 08/15/2019 225  140 - 450 10*3/mm3 Final   • Neutrophil % 08/15/2019 74.8  42.7 - 76.0 % Final   • Lymphocyte % 08/15/2019 17.8* 19.6 - 45.3 % Final   • Monocyte % 08/15/2019 5.5  5.0 - 12.0 % Final   • Eosinophil % 08/15/2019 1.5  0.3 - 6.2 % Final   • Basophil % 08/15/2019 0.3  0.0 - 1.5 % Final   • Immature Grans % 08/15/2019 0.1  0.0 - 0.5 % Final   • Neutrophils, Absolute 08/15/2019 7.34* 1.70 - 7.00 10*3/mm3 Final   • Lymphocytes, Absolute 08/15/2019 1.75  0.70 - 3.10 10*3/mm3  Final   • Monocytes, Absolute 08/15/2019 0.54  0.10 - 0.90 10*3/mm3 Final   • Eosinophils, Absolute 08/15/2019 0.15  0.00 - 0.40 10*3/mm3 Final   • Basophils, Absolute 08/15/2019 0.03  0.00 - 0.20 10*3/mm3 Final   • Immature Grans, Absolute 08/15/2019 0.01  0.00 - 0.05 10*3/mm3 Final     Ct Sinus Without Contrast    Result Date: 8/22/2019  Narrative: CT SINUSES, 08/22/2019  HISTORY: 54-year-old female referred for history of chronic sinusitis.  TECHNIQUE:  Thin section axial CT images through the paranasal sinuses with multiplanar images reconstruction. Radiation dose reduction techniques included automated exposure control or exposure modulation based on body size. Radiation audit for CT and nuclear cardiology exams in the last 12 months: 3.  FINDINGS:  11 mm mucous retention cyst or polyp within the floor of the right maxillary sinus. The sinuses and nasal passageways are otherwise clear. No ostiomeatal occlusion or narrowing on the right or left.  Mild nasal septal deviation to the right. Vicky bullosa formation within the anterior left middle turbinate.      Impression: Negative sinus CT examination, with the exception of a small mucous retention cyst in the floor of the right maxillary sinus.  This report was finalized on 8/22/2019 3:28 PM by Dr. Parvez Parra MD.        PHYSICAL EXAM  Physical Exam  Alert white female in no active distress she is oriented x3.  She does not have any clinical jaundice.  Her heart shows regular rate and rhythm her lungs are clear and equal.  She has mild subjective right upper quadrant and epigastric tenderness without mass.  Ultrasound of her abdomen was reviewed by me and shows cholelithiasis.  Her liver function tests were normal.  She does have a small umbilical hernia that is reducible.  ASSESSMENT  Cholelithiasis  Umbilical hernia      PLAN  The risk benefits and options were discussed with the patient in detail.  We will proceed with a laparoscopic cholecystectomy and  repair of her umbilical hernia at [facility] on September 11

## 2019-09-04 NOTE — H&P
PATIENT INFORMATION  Irais Bolivar       - 1965    CHIEF COMPLAINT  Chief Complaint   Patient presents with   • Abdominal Pain   abdominal pain since November    HISTORY OF PRESENT ILLNESS  HPI she complains of a 1 year history of intermittent episodic epigastric and upper abdominal pain.  This is associated with eating and primarily greasy foods.  She has some chills but no fever she denies any jaundice type symptoms.  She says the pain has been increasing.  She went to the emergency room in August for this she had a prior ultrasound of her abdomen and lab work        REVIEW OF SYSTEMS  Review of Systems all other systems negative      ACTIVE PROBLEMS  Patient Active Problem List    Diagnosis   • COPD exacerbation (CMS/[facility]) [J44.1]         PAST MEDICAL HISTORY  Past Medical History:   Diagnosis Date   • Cancer (CMS/[facility])     cervical 16 yrs ago   • Cholelithiasis 2018   • COPD (chronic obstructive pulmonary disease) (CMS/[facility]) [id number]         SURGICAL HISTORY  Past Surgical History:   Procedure Laterality Date   • BLADDER REPAIR     • CHEST TUBE INSERTION      27 yrs ago   • DILATATION AND CURETTAGE     • HYSTERECTOMY           FAMILY HISTORY  Family History   Problem Relation Age of Onset   • Lung disease Mother    • Dementia Father    • No Known Problems Sister    • Asthma Daughter    • Cancer Daughter    • Miscarriages / Stillbirths Daughter    • No Known Problems Son    • Diabetes Maternal Grandmother    • Heart disease Maternal Grandmother    • No Known Problems Sister    • No Known Problems Sister    • No Known Problems Sister          SOCIAL HISTORY  Social History     Occupational History   • Not on file   Tobacco Use   • Smoking status: Former Smoker     Packs/day: 1.00     Years: 25.00     Pack years: 25.00     Types: Cigarettes     Last attempt to quit: 2018     Years since quittin.7   • Smokeless tobacco: Never Used   Substance and Sexual Activity   • Alcohol use: No      "Frequency: Never   • Drug use: No   • Sexual activity: Yes     Partners: Male     Birth control/protection: Other, None       Debilities/Disabilities Identified: None    Emotional Behavior: Appropriate    CURRENT MEDICATIONS    Current Outpatient Medications:   •  acetaminophen (TYLENOL) 325 MG tablet, Take 2 tablets by mouth Every 4 (Four) Hours As Needed for Mild Pain ., Disp: , Rfl:   •  albuterol 108 (90 Base) MCG/ACT inhaler, Inhale 2 puffs Every 4 (Four) Hours As Needed for Wheezing or Shortness of Air., Disp: 1 inhaler, Rfl: 1  •  budesonide-formoterol (SYMBICORT) 160-4.5 MCG/ACT inhaler, Inhale 2 puffs 2 (Two) Times a Day., Disp: 1 inhaler, Rfl: 0  •  fexofenadine (ALLEGRA) 180 MG tablet, Take 180 mg by mouth Daily., Disp: , Rfl:   •  glycopyrrolate (ROBINUL) 1 MG tablet, 1 tablet p.o. every 6 hours as needed abdominal pain, Disp: 20 tablet, Rfl: 0  •  guaiFENesin (MUCINEX) 600 MG 12 hr tablet, Take 1 tablet by mouth Every 12 (Twelve) Hours., Disp: 60 tablet, Rfl: 0  •  HYDROcodone-acetaminophen (NORCO) 5-325 MG per tablet, Take 1-2 tablets by mouth Every 4 (Four) Hours As Needed for Severe Pain ., Disp: 16 tablet, Rfl: 0  •  levothyroxine (SYNTHROID, LEVOTHROID) 25 MCG tablet, Take 25 mcg by mouth Daily., Disp: , Rfl:   •  ondansetron ODT (ZOFRAN-ODT) 8 MG disintegrating tablet, One tablet po q 6 hours PRN nausea and vomiting, Disp: 10 tablet, Rfl: 0  •  sulfamethoxazole-trimethoprim (BACTRIM DS,SEPTRA DS) 800-160 MG per tablet, Take 1 tablet by mouth 2 (Two) Times a Day., Disp: , Rfl:     ALLERGIES  Patient has no known allergies.    VITALS  Vitals:    09/04/19 1302   BP: 128/70   Resp: 18   Weight: 69.9 kg (154 lb)   Height: 157.5 cm (62\")       LAST RESULTS   Admission on 08/15/2019, Discharged on 08/15/2019   Component Date Value Ref Range Status   • Glucose 08/15/2019 113* 65 - 99 mg/dL Final   • BUN 08/15/2019 10  6 - 20 mg/dL Final   • Creatinine 08/15/2019 0.65  0.57 - 1.00 mg/dL Final   • Sodium " 08/15/2019 139  136 - 145 mmol/L Final   • Potassium 08/15/2019 3.8  3.5 - 5.2 mmol/L Final   • Chloride 08/15/2019 103  98 - 107 mmol/L Final   • CO2 08/15/2019 25.2  22.0 - 29.0 mmol/L Final   • Calcium 08/15/2019 9.1  8.6 - 10.5 mg/dL Final   • Total Protein 08/15/2019 7.1  6.0 - 8.5 g/dL Final   • Albumin 08/15/2019 3.90  3.50 - 5.20 g/dL Final   • ALT (SGPT) 08/15/2019 12  1 - 33 U/L Final   • AST (SGOT) 08/15/2019 11  1 - 32 U/L Final   • Alkaline Phosphatase 08/15/2019 58  39 - 117 U/L Final   • Total Bilirubin 08/15/2019 0.4  0.2 - 1.2 mg/dL Final   • eGFR Non African Amer 08/15/2019 96  >60 mL/min/1.73 Final   • Globulin 08/15/2019 3.2  gm/dL Final   • A/G Ratio 08/15/2019 1.2  g/dL Final   • BUN/Creatinine Ratio 08/15/2019 15.4  7.0 - 25.0 Final   • Anion Gap 08/15/2019 10.8  5.0 - 15.0 mmol/L Final   • Lipase 08/15/2019 17  13 - 60 U/L Final   • LDH 08/15/2019 174  135 - 214 U/L Final   • Amylase 08/15/2019 40  28 - 100 U/L Final   • WBC 08/15/2019 9.82  3.40 - 10.80 10*3/mm3 Final   • RBC 08/15/2019 5.27  3.77 - 5.28 10*6/mm3 Final   • Hemoglobin 08/15/2019 15.5  12.0 - 15.9 g/dL Final   • Hematocrit 08/15/2019 48.4* 34.0 - 46.6 % Final   • MCV 08/15/2019 91.8  79.0 - 97.0 fL Final   • MCH 08/15/2019 29.4  26.6 - 33.0 pg Final   • MCHC 08/15/2019 32.0  31.5 - 35.7 g/dL Final   • RDW 08/15/2019 12.9  12.3 - 15.4 % Final   • RDW-SD 08/15/2019 44.1  37.0 - 54.0 fl Final   • MPV 08/15/2019 8.5  6.0 - 12.0 fL Final   • Platelets 08/15/2019 225  140 - 450 10*3/mm3 Final   • Neutrophil % 08/15/2019 74.8  42.7 - 76.0 % Final   • Lymphocyte % 08/15/2019 17.8* 19.6 - 45.3 % Final   • Monocyte % 08/15/2019 5.5  5.0 - 12.0 % Final   • Eosinophil % 08/15/2019 1.5  0.3 - 6.2 % Final   • Basophil % 08/15/2019 0.3  0.0 - 1.5 % Final   • Immature Grans % 08/15/2019 0.1  0.0 - 0.5 % Final   • Neutrophils, Absolute 08/15/2019 7.34* 1.70 - 7.00 10*3/mm3 Final   • Lymphocytes, Absolute 08/15/2019 1.75  0.70 - 3.10 10*3/mm3  Final   • Monocytes, Absolute 08/15/2019 0.54  0.10 - 0.90 10*3/mm3 Final   • Eosinophils, Absolute 08/15/2019 0.15  0.00 - 0.40 10*3/mm3 Final   • Basophils, Absolute 08/15/2019 0.03  0.00 - 0.20 10*3/mm3 Final   • Immature Grans, Absolute 08/15/2019 0.01  0.00 - 0.05 10*3/mm3 Final     Ct Sinus Without Contrast    Result Date: 8/22/2019  Narrative: CT SINUSES, 08/22/2019  HISTORY: 54-year-old female referred for history of chronic sinusitis.  TECHNIQUE:  Thin section axial CT images through the paranasal sinuses with multiplanar images reconstruction. Radiation dose reduction techniques included automated exposure control or exposure modulation based on body size. Radiation audit for CT and nuclear cardiology exams in the last 12 months: 3.  FINDINGS:  11 mm mucous retention cyst or polyp within the floor of the right maxillary sinus. The sinuses and nasal passageways are otherwise clear. No ostiomeatal occlusion or narrowing on the right or left.  Mild nasal septal deviation to the right. Vicky bullosa formation within the anterior left middle turbinate.      Impression: Negative sinus CT examination, with the exception of a small mucous retention cyst in the floor of the right maxillary sinus.  This report was finalized on 8/22/2019 3:28 PM by Dr. Parvez Parra MD.        PHYSICAL EXAM  Physical Exam  Alert white female in no active distress she is oriented x3.  She does not have any clinical jaundice.  Her heart shows regular rate and rhythm her lungs are clear and equal.  She has mild subjective right upper quadrant and epigastric tenderness without mass.  Ultrasound of her abdomen was reviewed by me and shows cholelithiasis.  Her liver function tests were normal.  She does have a small umbilical hernia that is reducible.  ASSESSMENT  Cholelithiasis  Umbilical hernia      PLAN  The risk benefits and options were discussed with the patient in detail.  We will proceed with a laparoscopic cholecystectomy and  repair of her umbilical hernia at [facility] on September 11

## 2019-09-04 NOTE — PROGRESS NOTES
PATIENT INFORMATION  Irais Bolivar       - 1965    CHIEF COMPLAINT  Chief Complaint   Patient presents with   • Abdominal Pain   abdominal pain since November    HISTORY OF PRESENT ILLNESS  HPI she complains of a 1 year history of intermittent episodic epigastric and upper abdominal pain.  This is associated with eating and primarily greasy foods.  She has some chills but no fever she denies any jaundice type symptoms.  She says the pain has been increasing.  She went to the emergency room in August for this she had a prior ultrasound of her abdomen and lab work        REVIEW OF SYSTEMS  Review of Systems all other systems negative      ACTIVE PROBLEMS  Patient Active Problem List    Diagnosis   • COPD exacerbation (CMS/[facility]) [J44.1]         PAST MEDICAL HISTORY  Past Medical History:   Diagnosis Date   • Cancer (CMS/[facility])     cervical 16 yrs ago   • Cholelithiasis 2018   • COPD (chronic obstructive pulmonary disease) (CMS/[facility]) [id number]         SURGICAL HISTORY  Past Surgical History:   Procedure Laterality Date   • BLADDER REPAIR     • CHEST TUBE INSERTION      27 yrs ago   • DILATATION AND CURETTAGE     • HYSTERECTOMY           FAMILY HISTORY  Family History   Problem Relation Age of Onset   • Lung disease Mother    • Dementia Father    • No Known Problems Sister    • Asthma Daughter    • Cancer Daughter    • Miscarriages / Stillbirths Daughter    • No Known Problems Son    • Diabetes Maternal Grandmother    • Heart disease Maternal Grandmother    • No Known Problems Sister    • No Known Problems Sister    • No Known Problems Sister          SOCIAL HISTORY  Social History     Occupational History   • Not on file   Tobacco Use   • Smoking status: Former Smoker     Packs/day: 1.00     Years: 25.00     Pack years: 25.00     Types: Cigarettes     Last attempt to quit: 2018     Years since quittin.7   • Smokeless tobacco: Never Used   Substance and Sexual Activity   • Alcohol use: No      "Frequency: Never   • Drug use: No   • Sexual activity: Yes     Partners: Male     Birth control/protection: Other, None       Debilities/Disabilities Identified: None    Emotional Behavior: Appropriate    CURRENT MEDICATIONS    Current Outpatient Medications:   •  acetaminophen (TYLENOL) 325 MG tablet, Take 2 tablets by mouth Every 4 (Four) Hours As Needed for Mild Pain ., Disp: , Rfl:   •  albuterol 108 (90 Base) MCG/ACT inhaler, Inhale 2 puffs Every 4 (Four) Hours As Needed for Wheezing or Shortness of Air., Disp: 1 inhaler, Rfl: 1  •  budesonide-formoterol (SYMBICORT) 160-4.5 MCG/ACT inhaler, Inhale 2 puffs 2 (Two) Times a Day., Disp: 1 inhaler, Rfl: 0  •  fexofenadine (ALLEGRA) 180 MG tablet, Take 180 mg by mouth Daily., Disp: , Rfl:   •  glycopyrrolate (ROBINUL) 1 MG tablet, 1 tablet p.o. every 6 hours as needed abdominal pain, Disp: 20 tablet, Rfl: 0  •  guaiFENesin (MUCINEX) 600 MG 12 hr tablet, Take 1 tablet by mouth Every 12 (Twelve) Hours., Disp: 60 tablet, Rfl: 0  •  HYDROcodone-acetaminophen (NORCO) 5-325 MG per tablet, Take 1-2 tablets by mouth Every 4 (Four) Hours As Needed for Severe Pain ., Disp: 16 tablet, Rfl: 0  •  levothyroxine (SYNTHROID, LEVOTHROID) 25 MCG tablet, Take 25 mcg by mouth Daily., Disp: , Rfl:   •  ondansetron ODT (ZOFRAN-ODT) 8 MG disintegrating tablet, One tablet po q 6 hours PRN nausea and vomiting, Disp: 10 tablet, Rfl: 0  •  sulfamethoxazole-trimethoprim (BACTRIM DS,SEPTRA DS) 800-160 MG per tablet, Take 1 tablet by mouth 2 (Two) Times a Day., Disp: , Rfl:     ALLERGIES  Patient has no known allergies.    VITALS  Vitals:    09/04/19 1302   BP: 128/70   Resp: 18   Weight: 69.9 kg (154 lb)   Height: 157.5 cm (62\")       LAST RESULTS   Admission on 08/15/2019, Discharged on 08/15/2019   Component Date Value Ref Range Status   • Glucose 08/15/2019 113* 65 - 99 mg/dL Final   • BUN 08/15/2019 10  6 - 20 mg/dL Final   • Creatinine 08/15/2019 0.65  0.57 - 1.00 mg/dL Final   • Sodium " 08/15/2019 139  136 - 145 mmol/L Final   • Potassium 08/15/2019 3.8  3.5 - 5.2 mmol/L Final   • Chloride 08/15/2019 103  98 - 107 mmol/L Final   • CO2 08/15/2019 25.2  22.0 - 29.0 mmol/L Final   • Calcium 08/15/2019 9.1  8.6 - 10.5 mg/dL Final   • Total Protein 08/15/2019 7.1  6.0 - 8.5 g/dL Final   • Albumin 08/15/2019 3.90  3.50 - 5.20 g/dL Final   • ALT (SGPT) 08/15/2019 12  1 - 33 U/L Final   • AST (SGOT) 08/15/2019 11  1 - 32 U/L Final   • Alkaline Phosphatase 08/15/2019 58  39 - 117 U/L Final   • Total Bilirubin 08/15/2019 0.4  0.2 - 1.2 mg/dL Final   • eGFR Non African Amer 08/15/2019 96  >60 mL/min/1.73 Final   • Globulin 08/15/2019 3.2  gm/dL Final   • A/G Ratio 08/15/2019 1.2  g/dL Final   • BUN/Creatinine Ratio 08/15/2019 15.4  7.0 - 25.0 Final   • Anion Gap 08/15/2019 10.8  5.0 - 15.0 mmol/L Final   • Lipase 08/15/2019 17  13 - 60 U/L Final   • LDH 08/15/2019 174  135 - 214 U/L Final   • Amylase 08/15/2019 40  28 - 100 U/L Final   • WBC 08/15/2019 9.82  3.40 - 10.80 10*3/mm3 Final   • RBC 08/15/2019 5.27  3.77 - 5.28 10*6/mm3 Final   • Hemoglobin 08/15/2019 15.5  12.0 - 15.9 g/dL Final   • Hematocrit 08/15/2019 48.4* 34.0 - 46.6 % Final   • MCV 08/15/2019 91.8  79.0 - 97.0 fL Final   • MCH 08/15/2019 29.4  26.6 - 33.0 pg Final   • MCHC 08/15/2019 32.0  31.5 - 35.7 g/dL Final   • RDW 08/15/2019 12.9  12.3 - 15.4 % Final   • RDW-SD 08/15/2019 44.1  37.0 - 54.0 fl Final   • MPV 08/15/2019 8.5  6.0 - 12.0 fL Final   • Platelets 08/15/2019 225  140 - 450 10*3/mm3 Final   • Neutrophil % 08/15/2019 74.8  42.7 - 76.0 % Final   • Lymphocyte % 08/15/2019 17.8* 19.6 - 45.3 % Final   • Monocyte % 08/15/2019 5.5  5.0 - 12.0 % Final   • Eosinophil % 08/15/2019 1.5  0.3 - 6.2 % Final   • Basophil % 08/15/2019 0.3  0.0 - 1.5 % Final   • Immature Grans % 08/15/2019 0.1  0.0 - 0.5 % Final   • Neutrophils, Absolute 08/15/2019 7.34* 1.70 - 7.00 10*3/mm3 Final   • Lymphocytes, Absolute 08/15/2019 1.75  0.70 - 3.10 10*3/mm3  Final   • Monocytes, Absolute 08/15/2019 0.54  0.10 - 0.90 10*3/mm3 Final   • Eosinophils, Absolute 08/15/2019 0.15  0.00 - 0.40 10*3/mm3 Final   • Basophils, Absolute 08/15/2019 0.03  0.00 - 0.20 10*3/mm3 Final   • Immature Grans, Absolute 08/15/2019 0.01  0.00 - 0.05 10*3/mm3 Final     Ct Sinus Without Contrast    Result Date: 8/22/2019  Narrative: CT SINUSES, 08/22/2019  HISTORY: 54-year-old female referred for history of chronic sinusitis.  TECHNIQUE:  Thin section axial CT images through the paranasal sinuses with multiplanar images reconstruction. Radiation dose reduction techniques included automated exposure control or exposure modulation based on body size. Radiation audit for CT and nuclear cardiology exams in the last 12 months: 3.  FINDINGS:  11 mm mucous retention cyst or polyp within the floor of the right maxillary sinus. The sinuses and nasal passageways are otherwise clear. No ostiomeatal occlusion or narrowing on the right or left.  Mild nasal septal deviation to the right. Vicky bullosa formation within the anterior left middle turbinate.      Impression: Negative sinus CT examination, with the exception of a small mucous retention cyst in the floor of the right maxillary sinus.  This report was finalized on 8/22/2019 3:28 PM by Dr. Parvez Parra MD.        PHYSICAL EXAM  Physical Exam  Alert white female in no active distress she is oriented x3.  She does not have any clinical jaundice.  Her heart shows regular rate and rhythm her lungs are clear and equal.  She has mild subjective right upper quadrant and epigastric tenderness without mass.  Ultrasound of her abdomen was reviewed by me and shows cholelithiasis.  Her liver function tests were normal.  She does have a small umbilical hernia that is reducible.  ASSESSMENT  Cholelithiasis  Umbilical hernia      PLAN  The risk benefits and options were discussed with the patient in detail.  We will proceed with a laparoscopic cholecystectomy and  repair of her umbilical hernia at [facility] on September 11

## 2019-09-09 ENCOUNTER — APPOINTMENT (OUTPATIENT)
Dept: PREADMISSION TESTING | Facility: HOSPITAL | Age: 54
End: 2019-09-09

## 2019-09-09 VITALS
WEIGHT: 153.6 LBS | HEART RATE: 84 BPM | OXYGEN SATURATION: 97 % | RESPIRATION RATE: 16 BRPM | HEIGHT: 62 IN | BODY MASS INDEX: 28.26 KG/M2 | SYSTOLIC BLOOD PRESSURE: 107 MMHG | DIASTOLIC BLOOD PRESSURE: 71 MMHG

## 2019-09-09 DIAGNOSIS — K42.9 UMBILICAL HERNIA WITHOUT OBSTRUCTION AND WITHOUT GANGRENE: ICD-10-CM

## 2019-09-09 DIAGNOSIS — K80.20 CALCULUS OF GALLBLADDER WITHOUT CHOLECYSTITIS WITHOUT OBSTRUCTION: ICD-10-CM

## 2019-09-09 LAB
ALBUMIN SERPL-MCNC: 4.3 G/DL (ref 3.5–5.2)
ALBUMIN/GLOB SERPL: 1.2 G/DL
ALP SERPL-CCNC: 72 U/L (ref 39–117)
ALT SERPL W P-5'-P-CCNC: 16 U/L (ref 1–33)
ANION GAP SERPL CALCULATED.3IONS-SCNC: 12.8 MMOL/L (ref 5–15)
AST SERPL-CCNC: 13 U/L (ref 1–32)
BILIRUB SERPL-MCNC: 0.3 MG/DL (ref 0.2–1.2)
BUN BLD-MCNC: 7 MG/DL (ref 6–20)
BUN/CREAT SERPL: 9.5 (ref 7–25)
CALCIUM SPEC-SCNC: 9.5 MG/DL (ref 8.6–10.5)
CHLORIDE SERPL-SCNC: 102 MMOL/L (ref 98–107)
CO2 SERPL-SCNC: 28.2 MMOL/L (ref 22–29)
CREAT BLD-MCNC: 0.74 MG/DL (ref 0.57–1)
DEPRECATED RDW RBC AUTO: 44.4 FL (ref 37–54)
ERYTHROCYTE [DISTWIDTH] IN BLOOD BY AUTOMATED COUNT: 12.9 % (ref 12.3–15.4)
GFR SERPL CREATININE-BSD FRML MDRD: 82 ML/MIN/1.73
GLOBULIN UR ELPH-MCNC: 3.5 GM/DL
GLUCOSE BLD-MCNC: 106 MG/DL (ref 65–99)
HCT VFR BLD AUTO: 50.2 % (ref 34–46.6)
HGB BLD-MCNC: 15.9 G/DL (ref 12–15.9)
MCH RBC QN AUTO: 29.3 PG (ref 26.6–33)
MCHC RBC AUTO-ENTMCNC: 31.7 G/DL (ref 31.5–35.7)
MCV RBC AUTO: 92.4 FL (ref 79–97)
PLATELET # BLD AUTO: 231 10*3/MM3 (ref 140–450)
PMV BLD AUTO: 8.8 FL (ref 6–12)
POTASSIUM BLD-SCNC: 4 MMOL/L (ref 3.5–5.2)
PROT SERPL-MCNC: 7.8 G/DL (ref 6–8.5)
RBC # BLD AUTO: 5.43 10*6/MM3 (ref 3.77–5.28)
SODIUM BLD-SCNC: 143 MMOL/L (ref 136–145)
WBC NRBC COR # BLD: 8.25 10*3/MM3 (ref 3.4–10.8)

## 2019-09-09 PROCEDURE — 85027 COMPLETE CBC AUTOMATED: CPT | Performed by: SURGERY

## 2019-09-09 PROCEDURE — 93005 ELECTROCARDIOGRAM TRACING: CPT

## 2019-09-09 PROCEDURE — 80053 COMPREHEN METABOLIC PANEL: CPT | Performed by: SURGERY

## 2019-09-09 PROCEDURE — 93010 ELECTROCARDIOGRAM REPORT: CPT | Performed by: INTERNAL MEDICINE

## 2019-09-09 PROCEDURE — 36415 COLL VENOUS BLD VENIPUNCTURE: CPT

## 2019-09-09 RX ORDER — MULTIPLE VITAMINS W/ MINERALS TAB 9MG-400MCG
1 TAB ORAL DAILY
COMMUNITY

## 2019-09-09 NOTE — PAT
Patient here for PAT visit. Labs, EKG complete. Pre-op instructions complete, verbalizes understanding.

## 2019-09-09 NOTE — DISCHARGE INSTRUCTIONS
HOLD MULTIVITAMIN FOR SURGERY    BRING INHALERS THE DAY OF SURGERY    PRE-ADMISSION TESTING INSTRUCTIONS FOR ADULTS    Take these medications the morning of surgery with a small sip of water: NONE    STOP CLEARS/GATORADE @ 5:30 AM      No aspirin, advil, aleve, ibuprofen, naproxen, diet pills, decongestants, or herbal/vitamins for a week prior to surgery.    General Instructions:    • Do not eat solid food after midnight the night before surgery.  No gum, mints, or hard candy after midnight the night before surgery.  • You may drink clear liquids the day of surgery up until 2 hours before your arrival time.  • Clear liquids are liquids you can see through. Nothing RED in color.    Plain water    Sports drinks  Sodas     Gelatin (Jell-O)  Fruit juices without pulp such as white grape juice and apple juice  Popsicles that contain no fruit or yogurt  Tea or coffee (no cream or milk added)    • It is beneficial for you to have a clear drink that contains carbohydrates just before you leave your house and before your fasting time begins.  We suggest a 20 ounce bottle of Gatorade or Powerade for non-diabetic patients or a 20 ounce bottle of G2 or Powerade Zero for diabetic patients.     • Patients who avoid smoking, chewing tobacco and alcohol for 4 weeks prior to surgery have a reduced risk of post-operative complications.  If at all possible, quit smoking as many days before surgery as you can.    • Do not smoke, use chewing tobacco or drink alcohol the day of surgery    • Bring your C-PAP/ BI-PAP machine if you use one.  • Wear clean comfortable clothes and socks.  • Do not wear contact lenses, lotion, deodorant, or make-up.  Bring a case for your glasses if applicable. You may brush your teeth the morning of surgery.  • You may wear dentures/partials, do not put adhesive/glue on them.  • Bring crutches or walker if applicable.  • Leave all other jewelry and valuables at home.      Preventing a Surgical Site  Infection:    • Shower the night before and on the morning of surgery using the chlorhexidine soap you were given.  Use a clean washcloth with the soap.  Place clean sheets on your bed after showering the night before surgery. Do not use the CHG soap on your hair, face, or private areas. Wash your body gently for five (5) minutes. Do not scrub your skin.  Dry with a clean towel and dress in clean clothing.    • Do not shave the surgical area for 10 days-2 weeks prior to surgery  because the razor can irritate skin and make it easier to develop an infection.  • Make sure you, your family, and all healthcare providers clean their hands with soap and water or an alcohol based hand  before caring for you or your wound.      Day of surgery:    Your surgeon’s office will advise you of your arrival time for the day of surgery.    Upon arrival, a Pre-op nurse and Anesthesia provider will review your health history, obtain vital signs, and answer questions you may have.  The only belongings needed at this time will be your home medications and if applicable your C-PAP/BI-PAP machine.  If you are staying overnight your family can leave the rest of your belongings in the car and bring them to your room later.  A Pre-op nurse will start an IV and you may receive medication in preparation for surgery, including something to help you relax.  Your family will be able to see you in the Pre-op area.  While you are in surgery your family should notify the waiting room  if they leave the waiting room area and provide a contact phone number.    IF you have any questions, you can call the Pre-Admission Department at [phone] or your surgeon's office.  Notify your surgeon if  you become sick, have a fever, productive cough, or cannot be here the day of surgery    Please be aware that surgery does come with discomfort.  We want to make every effort to control your discomfort so please discuss any uncontrolled  symptoms with your nurse.   Your doctor will most likely have prescribed pain medications.      If you are going home after surgery, you will receive individualized written care instructions before being discharged.  A responsible adult (over the age of 18) must drive you to and from the hospital on the day of your surgery and stay with you for 24 hours after anesthesia.    If you are staying overnight following surgery, you will be transported to your hospital room following the recovery period.  [facility] has all private rooms.    You may receive a survey regarding the care you received. Your feedback is very important and will be used to collect the necessary data to help us to continue to provide excellent care.     Deductibles and co-payments are collected on the day of service. Please be prepared to pay the required co-pay, deductible or deposit on the day of service as defined by your plan.

## 2019-09-10 ENCOUNTER — ANESTHESIA EVENT (OUTPATIENT)
Dept: PERIOP | Facility: HOSPITAL | Age: 54
End: 2019-09-10

## 2019-09-11 ENCOUNTER — ANESTHESIA (OUTPATIENT)
Dept: PERIOP | Facility: HOSPITAL | Age: 54
End: 2019-09-11

## 2019-09-11 ENCOUNTER — HOSPITAL ENCOUNTER (OUTPATIENT)
Facility: HOSPITAL | Age: 54
Setting detail: HOSPITAL OUTPATIENT SURGERY
Discharge: HOME | End: 2019-09-11
Attending: SURGERY | Admitting: SURGERY

## 2019-09-11 VITALS
DIASTOLIC BLOOD PRESSURE: 70 MMHG | SYSTOLIC BLOOD PRESSURE: 115 MMHG | TEMPERATURE: 98.6 F | RESPIRATION RATE: 16 BRPM | WEIGHT: 154.4 LBS | BODY MASS INDEX: 28.24 KG/M2 | HEART RATE: 69 BPM | OXYGEN SATURATION: 92 %

## 2019-09-11 DIAGNOSIS — K80.20 CALCULUS OF GALLBLADDER WITHOUT CHOLECYSTITIS WITHOUT OBSTRUCTION: ICD-10-CM

## 2019-09-11 DIAGNOSIS — K42.9 UMBILICAL HERNIA WITHOUT OBSTRUCTION AND WITHOUT GANGRENE: ICD-10-CM

## 2019-09-11 PROCEDURE — 25010000002 NEOSTIGMINE 10 MG/10ML SOLUTION: Performed by: NURSE ANESTHETIST, CERTIFIED REGISTERED

## 2019-09-11 PROCEDURE — 25010000002 FENTANYL CITRATE (PF) 100 MCG/2ML SOLUTION: Performed by: NURSE ANESTHETIST, CERTIFIED REGISTERED

## 2019-09-11 PROCEDURE — 47562 LAPAROSCOPIC CHOLECYSTECTOMY: CPT | Performed by: SURGERY

## 2019-09-11 PROCEDURE — 25010000002 ONDANSETRON PER 1 MG: Performed by: NURSE ANESTHETIST, CERTIFIED REGISTERED

## 2019-09-11 PROCEDURE — 25010000002 MIDAZOLAM PER 1 MG: Performed by: NURSE ANESTHETIST, CERTIFIED REGISTERED

## 2019-09-11 PROCEDURE — 94640 AIRWAY INHALATION TREATMENT: CPT

## 2019-09-11 PROCEDURE — 88304 TISSUE EXAM BY PATHOLOGIST: CPT | Performed by: SURGERY

## 2019-09-11 PROCEDURE — 94799 UNLISTED PULMONARY SVC/PX: CPT

## 2019-09-11 PROCEDURE — 25010000002 PROPOFOL 10 MG/ML EMULSION: Performed by: NURSE ANESTHETIST, CERTIFIED REGISTERED

## 2019-09-11 PROCEDURE — 25010000002 SUCCINYLCHOLINE PER 20 MG: Performed by: NURSE ANESTHETIST, CERTIFIED REGISTERED

## 2019-09-11 PROCEDURE — 25010000002 DEXAMETHASONE PER 1 MG: Performed by: NURSE ANESTHETIST, CERTIFIED REGISTERED

## 2019-09-11 PROCEDURE — 25010000003 CEFAZOLIN SODIUM-DEXTROSE 2-3 GM-%(50ML) RECONSTITUTED SOLUTION: Performed by: SURGERY

## 2019-09-11 RX ORDER — HYDROCODONE BITARTRATE AND ACETAMINOPHEN 5; 325 MG/1; MG/1
1-2 TABLET ORAL EVERY 4 HOURS PRN
Qty: 30 TABLET | Refills: 0 | Status: SHIPPED | OUTPATIENT
Start: 2019-09-11 | End: 2019-09-16

## 2019-09-11 RX ORDER — SODIUM CHLORIDE 9 MG/ML
40 INJECTION, SOLUTION INTRAVENOUS AS NEEDED
Status: DISCONTINUED | OUTPATIENT
Start: 2019-09-11 | End: 2019-09-11 | Stop reason: HOSPADM

## 2019-09-11 RX ORDER — LIDOCAINE HYDROCHLORIDE 10 MG/ML
0.5 INJECTION, SOLUTION EPIDURAL; INFILTRATION; INTRACAUDAL; PERINEURAL ONCE AS NEEDED
Status: DISCONTINUED | OUTPATIENT
Start: 2019-09-11 | End: 2019-09-11 | Stop reason: HOSPADM

## 2019-09-11 RX ORDER — KETAMINE HYDROCHLORIDE 10 MG/ML
INJECTION INTRAMUSCULAR; INTRAVENOUS AS NEEDED
Status: DISCONTINUED | OUTPATIENT
Start: 2019-09-11 | End: 2019-09-11 | Stop reason: SURG

## 2019-09-11 RX ORDER — ROCURONIUM BROMIDE 10 MG/ML
INJECTION, SOLUTION INTRAVENOUS AS NEEDED
Status: DISCONTINUED | OUTPATIENT
Start: 2019-09-11 | End: 2019-09-11 | Stop reason: SURG

## 2019-09-11 RX ORDER — FENTANYL CITRATE 50 UG/ML
50 INJECTION, SOLUTION INTRAMUSCULAR; INTRAVENOUS
Status: DISCONTINUED | OUTPATIENT
Start: 2019-09-11 | End: 2019-09-11 | Stop reason: HOSPADM

## 2019-09-11 RX ORDER — LIDOCAINE HYDROCHLORIDE 20 MG/ML
INJECTION, SOLUTION INFILTRATION; PERINEURAL AS NEEDED
Status: DISCONTINUED | OUTPATIENT
Start: 2019-09-11 | End: 2019-09-11 | Stop reason: SURG

## 2019-09-11 RX ORDER — SODIUM CHLORIDE, SODIUM LACTATE, POTASSIUM CHLORIDE, CALCIUM CHLORIDE 600; 310; 30; 20 MG/100ML; MG/100ML; MG/100ML; MG/100ML
9 INJECTION, SOLUTION INTRAVENOUS CONTINUOUS PRN
Status: DISCONTINUED | OUTPATIENT
Start: 2019-09-11 | End: 2019-09-11 | Stop reason: HOSPADM

## 2019-09-11 RX ORDER — SODIUM CHLORIDE 0.9 % (FLUSH) 0.9 %
1-10 SYRINGE (ML) INJECTION AS NEEDED
Status: DISCONTINUED | OUTPATIENT
Start: 2019-09-11 | End: 2019-09-11 | Stop reason: HOSPADM

## 2019-09-11 RX ORDER — MAGNESIUM HYDROXIDE 1200 MG/15ML
LIQUID ORAL AS NEEDED
Status: DISCONTINUED | OUTPATIENT
Start: 2019-09-11 | End: 2019-09-11 | Stop reason: HOSPADM

## 2019-09-11 RX ORDER — DEXAMETHASONE SODIUM PHOSPHATE 4 MG/ML
8 INJECTION, SOLUTION INTRA-ARTICULAR; INTRALESIONAL; INTRAMUSCULAR; INTRAVENOUS; SOFT TISSUE ONCE AS NEEDED
Status: COMPLETED | OUTPATIENT
Start: 2019-09-11 | End: 2019-09-11

## 2019-09-11 RX ORDER — SODIUM CHLORIDE 0.9 % (FLUSH) 0.9 %
3 SYRINGE (ML) INJECTION EVERY 12 HOURS SCHEDULED
Status: DISCONTINUED | OUTPATIENT
Start: 2019-09-11 | End: 2019-09-11 | Stop reason: HOSPADM

## 2019-09-11 RX ORDER — FENTANYL CITRATE 50 UG/ML
INJECTION, SOLUTION INTRAMUSCULAR; INTRAVENOUS AS NEEDED
Status: DISCONTINUED | OUTPATIENT
Start: 2019-09-11 | End: 2019-09-11 | Stop reason: SURG

## 2019-09-11 RX ORDER — BUPIVACAINE HYDROCHLORIDE AND EPINEPHRINE 2.5; 5 MG/ML; UG/ML
INJECTION, SOLUTION INFILTRATION; PERINEURAL AS NEEDED
Status: DISCONTINUED | OUTPATIENT
Start: 2019-09-11 | End: 2019-09-11 | Stop reason: HOSPADM

## 2019-09-11 RX ORDER — NEOSTIGMINE METHYLSULFATE 1 MG/ML
INJECTION, SOLUTION INTRAVENOUS AS NEEDED
Status: DISCONTINUED | OUTPATIENT
Start: 2019-09-11 | End: 2019-09-11 | Stop reason: SURG

## 2019-09-11 RX ORDER — PROPOFOL 10 MG/ML
VIAL (ML) INTRAVENOUS AS NEEDED
Status: DISCONTINUED | OUTPATIENT
Start: 2019-09-11 | End: 2019-09-11 | Stop reason: SURG

## 2019-09-11 RX ORDER — GLYCOPYRROLATE 0.2 MG/ML
INJECTION INTRAMUSCULAR; INTRAVENOUS AS NEEDED
Status: DISCONTINUED | OUTPATIENT
Start: 2019-09-11 | End: 2019-09-11 | Stop reason: SURG

## 2019-09-11 RX ORDER — CEFAZOLIN SODIUM 2 G/50ML
2 SOLUTION INTRAVENOUS ONCE
Status: COMPLETED | OUTPATIENT
Start: 2019-09-11 | End: 2019-09-11

## 2019-09-11 RX ORDER — SODIUM CHLORIDE, SODIUM LACTATE, POTASSIUM CHLORIDE, CALCIUM CHLORIDE 600; 310; 30; 20 MG/100ML; MG/100ML; MG/100ML; MG/100ML
100 INJECTION, SOLUTION INTRAVENOUS CONTINUOUS
Status: DISCONTINUED | OUTPATIENT
Start: 2019-09-11 | End: 2019-09-11 | Stop reason: HOSPADM

## 2019-09-11 RX ORDER — ONDANSETRON 2 MG/ML
4 INJECTION INTRAMUSCULAR; INTRAVENOUS ONCE AS NEEDED
Status: DISCONTINUED | OUTPATIENT
Start: 2019-09-11 | End: 2019-09-11 | Stop reason: HOSPADM

## 2019-09-11 RX ORDER — ONDANSETRON 2 MG/ML
4 INJECTION INTRAMUSCULAR; INTRAVENOUS ONCE AS NEEDED
Status: COMPLETED | OUTPATIENT
Start: 2019-09-11 | End: 2019-09-11

## 2019-09-11 RX ORDER — MIDAZOLAM HYDROCHLORIDE 1 MG/ML
2 INJECTION INTRAMUSCULAR; INTRAVENOUS
Status: DISCONTINUED | OUTPATIENT
Start: 2019-09-11 | End: 2019-09-11 | Stop reason: HOSPADM

## 2019-09-11 RX ORDER — IPRATROPIUM BROMIDE AND ALBUTEROL SULFATE 2.5; .5 MG/3ML; MG/3ML
3 SOLUTION RESPIRATORY (INHALATION) ONCE AS NEEDED
Status: COMPLETED | OUTPATIENT
Start: 2019-09-11 | End: 2019-09-11

## 2019-09-11 RX ORDER — SUCCINYLCHOLINE CHLORIDE 20 MG/ML
INJECTION INTRAMUSCULAR; INTRAVENOUS AS NEEDED
Status: DISCONTINUED | OUTPATIENT
Start: 2019-09-11 | End: 2019-09-11 | Stop reason: SURG

## 2019-09-11 RX ORDER — OXYCODONE HYDROCHLORIDE AND ACETAMINOPHEN 5; 325 MG/1; MG/1
1 TABLET ORAL ONCE AS NEEDED
Status: COMPLETED | OUTPATIENT
Start: 2019-09-11 | End: 2019-09-11

## 2019-09-11 RX ORDER — MIDAZOLAM HYDROCHLORIDE 1 MG/ML
1 INJECTION INTRAMUSCULAR; INTRAVENOUS
Status: DISCONTINUED | OUTPATIENT
Start: 2019-09-11 | End: 2019-09-11 | Stop reason: HOSPADM

## 2019-09-11 RX ADMIN — SUCCINYLCHOLINE CHLORIDE 200 MG: 20 INJECTION, SOLUTION INTRAMUSCULAR; INTRAVENOUS at 09:21

## 2019-09-11 RX ADMIN — MIDAZOLAM HYDROCHLORIDE 1 MG: 1 INJECTION, SOLUTION INTRAMUSCULAR; INTRAVENOUS at 08:45

## 2019-09-11 RX ADMIN — FENTANYL CITRATE 50 MCG: 50 INJECTION, SOLUTION INTRAMUSCULAR; INTRAVENOUS at 09:18

## 2019-09-11 RX ADMIN — FENTANYL CITRATE 50 MCG: 50 INJECTION, SOLUTION INTRAMUSCULAR; INTRAVENOUS at 10:04

## 2019-09-11 RX ADMIN — ROCURONIUM BROMIDE 20 MG: 10 INJECTION INTRAVENOUS at 09:35

## 2019-09-11 RX ADMIN — SODIUM CHLORIDE, POTASSIUM CHLORIDE, SODIUM LACTATE AND CALCIUM CHLORIDE: 600; 310; 30; 20 INJECTION, SOLUTION INTRAVENOUS at 09:16

## 2019-09-11 RX ADMIN — CEFAZOLIN SODIUM 2 G: 2 SOLUTION INTRAVENOUS at 09:29

## 2019-09-11 RX ADMIN — DEXAMETHASONE SODIUM PHOSPHATE 8 MG: 4 INJECTION, SOLUTION INTRAMUSCULAR; INTRAVENOUS at 08:45

## 2019-09-11 RX ADMIN — LIDOCAINE HYDROCHLORIDE 100 MG: 20 INJECTION, SOLUTION INFILTRATION; PERINEURAL at 09:18

## 2019-09-11 RX ADMIN — FENTANYL CITRATE 50 MCG: 50 INJECTION, SOLUTION INTRAMUSCULAR; INTRAVENOUS at 09:55

## 2019-09-11 RX ADMIN — MIDAZOLAM HYDROCHLORIDE 1 MG: 1 INJECTION, SOLUTION INTRAMUSCULAR; INTRAVENOUS at 09:13

## 2019-09-11 RX ADMIN — IPRATROPIUM BROMIDE AND ALBUTEROL SULFATE 3 ML: .5; 3 SOLUTION RESPIRATORY (INHALATION) at 13:22

## 2019-09-11 RX ADMIN — KETAMINE HYDROCHLORIDE 20 MG: 10 INJECTION INTRAMUSCULAR; INTRAVENOUS at 09:18

## 2019-09-11 RX ADMIN — PROPOFOL 200 MG: 10 INJECTION, EMULSION INTRAVENOUS at 09:21

## 2019-09-11 RX ADMIN — NEOSTIGMINE METHYLSULFATE 5 MG: 1 INJECTION INTRAVENOUS at 10:30

## 2019-09-11 RX ADMIN — OXYCODONE HYDROCHLORIDE AND ACETAMINOPHEN 1 TABLET: 5; 325 TABLET ORAL at 12:04

## 2019-09-11 RX ADMIN — FENTANYL CITRATE 50 MCG: 50 INJECTION, SOLUTION INTRAMUSCULAR; INTRAVENOUS at 09:33

## 2019-09-11 RX ADMIN — ONDANSETRON 4 MG: 2 INJECTION, SOLUTION INTRAMUSCULAR; INTRAVENOUS at 08:44

## 2019-09-11 RX ADMIN — FAMOTIDINE 20 MG: 10 INJECTION, SOLUTION INTRAVENOUS at 08:44

## 2019-09-11 RX ADMIN — GLYCOPYRROLATE 0.8 MG: 0.2 INJECTION INTRAMUSCULAR; INTRAVENOUS at 10:30

## 2019-09-11 NOTE — ANESTHESIA PREPROCEDURE EVALUATION
Anesthesia Evaluation     Patient summary reviewed and Nursing notes reviewed   NPO Solid Status: > 8 hours  NPO Liquid Status: > 2 hours           Airway   Mallampati: II  TM distance: >3 FB  Neck ROM: full  No difficulty expected  Dental    (+) lower dentures and upper dentures    Pulmonary     breath sounds clear to auscultation  (+) a smoker (quit 11/28/18. hx 25-30 years) Former, COPD ( last inhaler this morning.) moderate,   Cardiovascular   Exercise tolerance: good (4-7 METS)    ECG reviewed  Rhythm: regular  Rate: normal        Neuro/Psych  (+) headaches ( occasional migraine),     GI/Hepatic/Renal/Endo    (+)   hypothyroidism,     Musculoskeletal     Abdominal    Substance History      OB/GYN negative ob/gyn ROS         Other      history of cancer (hx cervical. had hysterectomy)    ROS/Med Hx Other: gatorade 20 oz at 0530    Results   ECG 12 Lead (Order [id number])   Order-Level Documents:     Scan on 9/9/2019 11:51 AM: ECG 12-LEAD        Order Questions     Question Answer Comment  Reason for Exam: Preop        ECG 12 Lead   Order: [id number]   Status:  Final result   Visible to patient:  No (Not Released) Next appt:  09/16/2019 at 12:15 PM in Endocrinology (Kevin Murillo MD) Dx:  Calculus of gallbladder without patrick...     Narrative       HEART RATE= 76  bpm  RR Interval= 784  ms  VA Interval= 162  ms  P Horizontal Axis= 24  deg  P Front Axis= 83  deg  QRSD Interval= 90  ms  QT Interval= 397  ms  QRS Axis= 74  deg  T Wave Axis= 66  deg  - NORMAL ECG -  Sinus rhythm  Electronically Signed By: Yury Arce ([facility]) 10-Sep-2019 09:51:13  Date and Time of Study: 2019-09-09 11:51:43    Specimen Collected: 09/09/19 11:51 Last Resulted: 09/10/19 09:51                Phys Exam Other: Breath sounds distant but clear in upper lobes. Clear lower lobes bilaterally. Will use home inhalers in preop prior to surgery.              Anesthesia Plan    ASA 2     general     intravenous induction   Anesthetic plan, all risks,  benefits, and alternatives have been provided, discussed and informed consent has been obtained with: patient.  Use of blood products discussed with patient  Consented to blood products.   Plan discussed with CRNA.

## 2019-09-11 NOTE — ANESTHESIA PROCEDURE NOTES
Airway  Urgency: elective    Date/Time: 9/11/2019 9:22 AM  End Time:9/11/2019 9:22 AM  Airway not difficult    General Information and Staff    Patient location during procedure: OR  CRNA: Kavita Richter CRNA    Indications and Patient Condition  Indications for airway management: airway protection    Preoxygenated: yes  MILS maintained throughout  Mask difficulty assessment: 0 - not attempted (RSI)    Final Airway Details  Final airway type: endotracheal airway      Successful airway: ETT  Cuffed: yes   Successful intubation technique: direct laryngoscopy  Facilitating devices/methods: intubating stylet and cricoid pressure  Endotracheal tube insertion site: oral  Blade: Davis  Blade size: 2  ETT size (mm): 7.5  Cormack-Lehane Classification: grade I - full view of glottis  Placement verified by: chest auscultation and capnometry   Measured from: lips  ETT/EBT  to lips (cm): 21  Number of attempts at approach: 1  Assessment: lips, teeth, and gum same as pre-op and atraumatic intubation    Additional Comments  BEBS, +ETCO2, VSS. GUMS AS PRE-OP.

## 2019-09-11 NOTE — OP NOTE
Preoperative diagnosis cholelithiasis, umbilical hernia  Postoperative diagnosis the same  Procedure laparoscopic cholecystectomy and repair of umbilical hernia  Complications none  Drains none  Estimated blood loss 30 cc  Anesthesia General endotracheal tube  Surgeon Dr. Boogie  Assistant Dr. Meade  Findings large gallstone impacted in the neck the gallbladder multiple omental adhesions up to the right upper quadrant liver and gallbladder, umbilical hernia  Procedure after satisfactory induction of general anesthesia the endotracheal tube the patient's abdomen was prepped and draped in sterile fashion.  Transverse infra umbilical skin incision was made.  Skin and subcutaneous tissue was divided.  Umbilicus was controlled circumferentially in the subcutaneous tissue with a Maday clamp.  Sac was divided along its base.  Fascia was controlled medially and laterally with 0 Ethibond stay sutures.  Jada trocar was placed within the abdomen.  Abdomen was insufflated to 14 mmHg with use of CO2.  General survey of the abdomen showed multiple omental adhesions in the right upper quadrant to the liver and to the gallbladder.  5 mm port was placed in the epigastrium after the site had been locally infiltrated quarter percent Marcaine with epinephrine.  Adhesions to the right upper quadrant were taken down hemostasis was assured.  Adhesions of the liver were taken down with cautery.  Hemostasis was assured.  2 additional 5 mm ports were placed in the right upper quadrant under direct vision after each site had been locally infiltrated with quarter percent Marcaine with epinephrine.  Gallbladder was grasped adhesions to the gallbladder were taken down.  She had an impacted stone in the neck the gallbladder.  Cystic duct was dissected out cystic artery dissected out.  She had a fat encased the gallbladder.  Several hemoclips were used in taking down the omentum and the fat from the gallbladder.  Cystic duct was milked back into  the gallbladder was clipped x3 and divided leaving 2 clips on the cystic duct stump.  Cystic artery was clipped x3 and divided leaving 2 clips on the cystic artery stump.  Gallbladder was sequentially taken out of the liver bed with use of electrocautery.  Gallbladder was dropped in Endo Catch bag removed from the abdomen was inspected found to be divided at the neck the gallbladder cystic duct junction.  Right upper quadrant was liberally irrigated hemostasis appeared to be good but because of the raw surfaces where the omentum was taken off the liver it was elected to place 5 cc of FloSeal over the sites.  5 mm ports were sequentially pulled back all were hemostatic.  Abdomen was desufflated.  Umbilical defect was closed in interrupted simple fashion with use of 0 Ethibond placing all sutures and tying at completion.  Umbilicus was tacked down to the fascia with interrupted 3-0 Vicryl simple suture.  Skin was closed with 4-0 Monocryl running subcuticular stitch burying the knots.  The patient tolerated the procedure well was transferred to the recovery room in stable condition.  When she is awake alert stable tolerating p.o. and has voided she will be discharged home to follow-up in my office next week call for problems.  Diet as tolerated.  No lifting more than 5 pounds.  She may shower in the a.m.  She was given a prescription for hydrocodone/Tylenol 5/325 1-2 p.o. every 4 hours as needed pain 30 these were dispensed without refills.  She should call for problems and call for an appointment.

## 2019-09-11 NOTE — INTERVAL H&P NOTE
H&P updated. The patient was examined and the following changes are noted:  vs  /80 (BP Location: Left arm, Patient Position: Lying)   Pulse 79   Temp 98 °F (36.7 °C) (Oral)   Resp 16   Wt 70 kg (154 lb 6.4 oz)   SpO2 94%   BMI 28.24 kg/m²

## 2019-09-11 NOTE — ANESTHESIA POSTPROCEDURE EVALUATION
Patient: Irais Bolivar    Procedure Summary     Date:  09/11/19 Room / Location:  formerly [facility] OR 4 /  [facility] OR    Anesthesia Start:  0916 Anesthesia Stop:  1053    Procedures:       CHOLECYSTECTOMY LAPAROSCOPIC (N/A Abdomen)      UMBILICAL HERNIA REPAIR (N/A Abdomen) Diagnosis:       Calculus of gallbladder without cholecystitis without obstruction      Umbilical hernia without obstruction and without gangrene      (Calculus of gallbladder without cholecystitis without obstruction [K80.20])      (Umbilical hernia without obstruction and without gangrene [K42.9])    Surgeon:  Antony Boogie MD Provider:  Kavita Richter CRNA    Anesthesia Type:  general ASA Status:  2          Anesthesia Type: general  Last vitals  BP   115/70 (09/11/19 1250)   Temp   98.6 °F (37 °C) (09/11/19 1128)   Pulse   69 (09/11/19 1425)   Resp   16 (09/11/19 1425)     SpO2   92 % (09/11/19 1425)     Post Anesthesia Care and Evaluation    Patient location during evaluation: bedside  Patient participation: complete - patient participated  Level of consciousness: awake  Pain score: 1  Pain management: adequate  Airway patency: patent  Anesthetic complications: No anesthetic complications  PONV Status: none  Cardiovascular status: acceptable  Respiratory status: acceptable  Hydration status: acceptable    Comments: Patient evaluated by anesthesia prior to discharge  but note entered late.

## 2019-09-12 LAB
CYTO UR: NORMAL
LAB AP CASE REPORT: NORMAL
PATH REPORT.FINAL DX SPEC: NORMAL
PATH REPORT.GROSS SPEC: NORMAL

## 2019-09-16 ENCOUNTER — OFFICE VISIT (OUTPATIENT)
Dept: ENDOCRINOLOGY | Age: 54
End: 2019-09-16

## 2019-09-16 VITALS
HEART RATE: 82 BPM | DIASTOLIC BLOOD PRESSURE: 64 MMHG | WEIGHT: 152 LBS | OXYGEN SATURATION: 98 % | SYSTOLIC BLOOD PRESSURE: 110 MMHG | BODY MASS INDEX: 27.97 KG/M2 | HEIGHT: 62 IN

## 2019-09-16 DIAGNOSIS — E03.9 ACQUIRED HYPOTHYROIDISM: ICD-10-CM

## 2019-09-16 DIAGNOSIS — E03.9 ACQUIRED HYPOTHYROIDISM: Primary | ICD-10-CM

## 2019-09-16 PROCEDURE — 99204 OFFICE O/P NEW MOD 45 MIN: CPT | Performed by: INTERNAL MEDICINE

## 2019-09-16 RX ORDER — LEVOTHYROXINE SODIUM 0.07 MG/1
TABLET ORAL
Refills: 1 | COMMUNITY
Start: 2019-09-11

## 2019-09-16 RX ORDER — GUAIFENESIN 600 MG/1
1200 TABLET, EXTENDED RELEASE ORAL 2 TIMES DAILY
COMMUNITY

## 2019-09-16 NOTE — PROGRESS NOTES
Chief Complaint   Patient presents with   • Hypothyroidism   NEW PATIENT APPOINTMENT/ HYPOTHYROIDISM    Irais Bolivar 54 y.o. presents as a new patient for the evaluation of Hypothyroidism. Consulted by  Ms. Reyes  Patient reports that she was diagnosed with hypothyroidism in January 2019 on routine blood work-up.  She was initially started on levothyroxine 100 mcg oral daily and based on the blood work-up her thyroid levels were further adjusted to 25 mcg oral daily.  And suddenly last week when patient's son went in to  the prescription her thyroid dosage was changed to 75 mcg oral daily.  She just had the blood work-up the day before.  She is not entirely sure of her blood work-up results.    She recently had a gallbladder surgery and a hernia repair a week ago and she is recovering from the surgery.  Does complain of feeling tired and exhausted, complains of pain at the surgical site, not able to eat much due to the pain.  Unclear if some of her symptoms are due to the surgery rather than with a thyroid problem.  No complaints of shortness of breath, issues with swallowing or change in voice.    No family history of thyroid disease.    Patient went through menopause 16 years ago after she underwent a total hysterectomy  She has 2 kids of her own    Reviewed primary care physician's/consulting physician documentation and lab results :     I have reviewed the patient's allergies, medicines, past medical hx, family hx and social hx in detail.    Past Medical History:   Diagnosis Date   • Cancer (CMS/HCC)     cervical 16 yrs ago   • Cholelithiasis november 2018   • COPD (chronic obstructive pulmonary disease) (CMS/HCC) 83507575   • Disease of thyroid gland     HYPOTHYROID   • Gallstones     SCHEDULED FOR  LAP CHRISTIANO   • History of transfusion    • Migraine    • Umbilical hernia     SCHEDULED FOR REPAIR       Family History   Problem Relation Age of Onset   • Lung disease Mother    • Dementia Father    • No  Known Problems Sister    • Asthma Daughter    • Cancer Daughter    • Miscarriages / Stillbirths Daughter    • No Known Problems Son    • Diabetes Maternal Grandmother    • Heart disease Maternal Grandmother    • No Known Problems Sister    • No Known Problems Sister    • No Known Problems Sister        Social History     Socioeconomic History   • Marital status: Single     Spouse name: Not on file   • Number of children: Not on file   • Years of education: Not on file   • Highest education level: Not on file   Tobacco Use   • Smoking status: Former Smoker     Packs/day: 1.00     Years: 25.00     Pack years: 25.00     Types: Cigarettes     Last attempt to quit: 2018     Years since quittin.8   • Smokeless tobacco: Never Used   Substance and Sexual Activity   • Alcohol use: No     Frequency: Never   • Drug use: No   • Sexual activity: Defer     Partners: Male     Birth control/protection: Other, None       No Known Allergies      Current Outpatient Medications:   •  acetaminophen (TYLENOL) 325 MG tablet, Take 2 tablets by mouth Every 4 (Four) Hours As Needed for Mild Pain ., Disp: , Rfl:   •  albuterol 108 (90 Base) MCG/ACT inhaler, Inhale 2 puffs Every 4 (Four) Hours As Needed for Wheezing or Shortness of Air., Disp: 1 inhaler, Rfl: 1  •  fexofenadine (ALLEGRA) 180 MG tablet, Take 180 mg by mouth Daily., Disp: , Rfl:   •  Fluticasone-Umeclidin-Vilant (TRELEGY ELLIPTA) 100-62.5-25 MCG/INH aerosol powder , Inhale 1 each Daily., Disp: , Rfl:   •  guaiFENesin (MUCINEX) 600 MG 12 hr tablet, Take 1,200 mg by mouth 2 (Two) Times a Day., Disp: , Rfl:   •  levothyroxine (SYNTHROID, LEVOTHROID) 75 MCG tablet, TAKE 1 TABLET BY MOUTH EVERY DAY ON EMPTY STOMACH IN THE MORNING, Disp: , Rfl: 1  •  Multiple Vitamins-Minerals (MULTIVITAMIN WITH MINERALS) tablet tablet, Take 1 tablet by mouth Daily., Disp: , Rfl:      Review of Systems   Constitutional: Negative for appetite change, fatigue and fever.   Eyes: Negative for  "visual disturbance.   Respiratory: Negative for shortness of breath.    Cardiovascular: Negative for palpitations and leg swelling.   Gastrointestinal: Negative for abdominal pain and vomiting.   Endocrine: Negative for polydipsia and polyuria.   Musculoskeletal: Negative for joint swelling and neck pain.   Skin: Negative for rash.   Neurological: Negative for weakness and numbness.   Psychiatric/Behavioral: Negative for behavioral problems.       Objective:    /64   Pulse 82   Ht 157.5 cm (62\")   Wt 68.9 kg (152 lb)   SpO2 98%   BMI 27.80 kg/m²     Physical Exam   Constitutional: She is oriented to person, place, and time. She appears well-nourished.   HENT:   Head: Normocephalic and atraumatic.   Eyes: Conjunctivae and EOM are normal. No scleral icterus.   Neck: Normal range of motion. Neck supple. No thyromegaly present.   Cardiovascular: Normal rate and normal heart sounds. Exam reveals no friction rub.   No murmur heard.  Pulmonary/Chest: Effort normal and breath sounds normal. No stridor. She has no wheezes. She has no rales.   Abdominal: Soft. Bowel sounds are normal. She exhibits no distension. There is tenderness.   Musculoskeletal: She exhibits no edema or tenderness.   Lymphadenopathy:     She has no cervical adenopathy.   Neurological: She is alert and oriented to person, place, and time.   Skin: Skin is warm and dry. She is not diaphoretic.   Psychiatric: She has a normal mood and affect.   Vitals reviewed.      Results Review:    I reviewed the patient's new clinical results.    Admission on 09/11/2019, Discharged on 09/11/2019   Component Date Value Ref Range Status   • Case Report 09/11/2019    Final                    Value:Surgical Pathology Report                         Case: [id number]                                  Authorizing Provider:  Antony Boogie MD        Collected:           09/11/2019 09:44 AM          Ordering Location:     [facility]   Received:           " " 09/11/2019 11:05 AM                                 OR                                                                           Pathologist:           Akin Laureano MD                                                         Specimen:    Gallbladder                                                                               • Final Diagnosis 09/11/2019    Final                    Value:This result contains rich text formatting which cannot be displayed here.   • Gross Description 09/11/2019    Final                    Value:This result contains rich text formatting which cannot be displayed here.   • Microscopic Description 09/11/2019    Final                    Value:This result contains rich text formatting which cannot be displayed here.         Irais was seen today for hypothyroidism.    Diagnoses and all orders for this visit:    Acquired hypothyroidism  -     Cancel: TSH; Future  -     Cancel: T4, Free; Future  -     Cancel: TSH; Future  -     Cancel: T4, Free; Future  -     Cancel: Hemoglobin A1c; Future  -     Cancel: Basic Metabolic Panel; Future  -     Cancel: Vitamin B12 & Folate; Future  -     Cancel: Vitamin D 25 Hydroxy; Future  -     Vitamin B12 & Folate; Future  -     Basic Metabolic Panel; Future  -     Vitamin D 25 Hydroxy; Future  -     Hemoglobin A1c; Future  -     T4, Free; Future  -     TSH; Future  -     T4, Free; Future  -     TSH; Future        Hypothyroidism  Check thyroid panel  Repeat thyroid levels in 4 to 6 weeks from now as her levothyroxine dosage was recently adjusted  Get the blood work-up from the primary care's office to further assess the reason for the dosage changes as described by the patient    Check HbA1c    Thank you for asking me to see your patient, Irais Bolivar in consultation.        Kevin Murillo MD  09/16/19    EMR Dragon / transcription disclaimer:     \"Dictated utilizing Dragon dictation\".          "

## 2019-09-17 ENCOUNTER — OFFICE VISIT (OUTPATIENT)
Dept: SURGERY | Facility: CLINIC | Age: 54
End: 2019-09-17

## 2019-09-17 DIAGNOSIS — Z09 SURGICAL FOLLOW-UP CARE: Primary | ICD-10-CM

## 2019-09-17 PROCEDURE — 99024 POSTOP FOLLOW-UP VISIT: CPT | Performed by: SURGERY

## 2019-09-17 NOTE — PROGRESS NOTES
6 day PO lap patrick -  No problems at this time  She complains of some swelling of her right arm just proximal to where her IV was.  She denies any other complaints.  She does not have any clinical jaundice.  Her incisions are healing well there is no impulse.  Her pathology was discussed.  Her right arm and some mild induration likely secondary to the IV site.  Recommended regular Tylenol or over-the-counter nonsteroidals for this.  I will see her back in 1 month

## 2019-10-14 ENCOUNTER — RESULTS ENCOUNTER (OUTPATIENT)
Dept: ENDOCRINOLOGY | Age: 54
End: 2019-10-14

## 2019-10-14 DIAGNOSIS — E03.9 ACQUIRED HYPOTHYROIDISM: ICD-10-CM

## 2019-10-16 ENCOUNTER — OFFICE VISIT (OUTPATIENT)
Dept: SURGERY | Facility: CLINIC | Age: 54
End: 2019-10-16

## 2019-10-16 DIAGNOSIS — Z09 SURGICAL FOLLOW-UP CARE: Primary | ICD-10-CM

## 2019-10-16 PROCEDURE — 99024 POSTOP FOLLOW-UP VISIT: CPT | Performed by: SURGERY

## 2019-10-16 NOTE — PROGRESS NOTES
5 weeks s/p lap patrick. No complaints.  2 of the 5mm port sites skin is superficially open.  Otherwise she is without complaints.  She does not have any clinical jaundice.  There is no impulse.  Her pathology was reviewed.  She can resume normal activities in 1 week local wound care was discussed and I will see her back PRN

## 2019-12-15 ENCOUNTER — RESULTS ENCOUNTER (OUTPATIENT)
Dept: ENDOCRINOLOGY | Age: 54
End: 2019-12-15

## 2019-12-15 DIAGNOSIS — E03.9 ACQUIRED HYPOTHYROIDISM: ICD-10-CM

## 2019-12-18 ENCOUNTER — HOSPITAL ENCOUNTER (OUTPATIENT)
Dept: PULMONOLOGY | Facility: HOSPITAL | Age: 54
End: 2019-12-18

## 2020-01-15 ENCOUNTER — HOSPITAL ENCOUNTER (OUTPATIENT)
Dept: PULMONOLOGY | Facility: HOSPITAL | Age: 55
Discharge: HOME | End: 2020-01-15
Admitting: INTERNAL MEDICINE

## 2020-01-15 VITALS — RESPIRATION RATE: 16 BRPM | OXYGEN SATURATION: 97 % | HEART RATE: 86 BPM

## 2020-01-15 DIAGNOSIS — J44.9 CHRONIC OBSTRUCTIVE PULMONARY DISEASE, UNSPECIFIED COPD TYPE (HCC): ICD-10-CM

## 2020-01-15 PROCEDURE — 94060 EVALUATION OF WHEEZING: CPT

## 2020-01-15 RX ORDER — ALBUTEROL SULFATE 2.5 MG/3ML
2.5 SOLUTION RESPIRATORY (INHALATION) ONCE
Status: COMPLETED | OUTPATIENT
Start: 2020-01-15 | End: 2020-01-15

## 2020-01-15 RX ADMIN — ALBUTEROL SULFATE 2.5 MG: 2.5 SOLUTION RESPIRATORY (INHALATION) at 13:08

## 2020-09-08 ENCOUNTER — TRANSCRIBE ORDERS (OUTPATIENT)
Dept: ADMINISTRATIVE | Facility: HOSPITAL | Age: 55
End: 2020-09-08

## 2020-09-08 DIAGNOSIS — R91.8 LUNG NODULES: Primary | ICD-10-CM

## 2020-09-17 ENCOUNTER — HOSPITAL ENCOUNTER (OUTPATIENT)
Dept: CT IMAGING | Facility: HOSPITAL | Age: 55
Discharge: HOME | End: 2020-09-17
Admitting: INTERNAL MEDICINE

## 2020-09-17 DIAGNOSIS — R91.8 LUNG NODULES: ICD-10-CM

## 2020-09-17 PROCEDURE — 71250 CT THORAX DX C-: CPT

## 2022-07-27 ENCOUNTER — TRANSCRIBE ORDERS (OUTPATIENT)
Dept: ADMINISTRATIVE | Facility: HOSPITAL | Age: 57
End: 2022-07-27

## 2022-07-27 DIAGNOSIS — J43.9 PULMONARY EMPHYSEMA, UNSPECIFIED EMPHYSEMA TYPE: Primary | ICD-10-CM

## 2022-07-27 DIAGNOSIS — Z01.818 OTHER SPECIFIED PRE-OPERATIVE EXAMINATION: Primary | ICD-10-CM

## 2022-08-02 ENCOUNTER — APPOINTMENT (OUTPATIENT)
Dept: LAB | Facility: HOSPITAL | Age: 57
End: 2022-08-02

## 2022-08-03 ENCOUNTER — APPOINTMENT (OUTPATIENT)
Dept: PULMONOLOGY | Facility: HOSPITAL | Age: 57
End: 2022-08-03

## 2022-10-05 ENCOUNTER — APPOINTMENT (OUTPATIENT)
Dept: PULMONOLOGY | Facility: HOSPITAL | Age: 57
End: 2022-10-05

## 2022-10-26 ENCOUNTER — TRANSCRIBE ORDERS (OUTPATIENT)
Dept: GENERAL RADIOLOGY | Facility: HOSPITAL | Age: 57
End: 2022-10-26

## 2022-10-26 ENCOUNTER — HOSPITAL ENCOUNTER (OUTPATIENT)
Dept: GENERAL RADIOLOGY | Facility: HOSPITAL | Age: 57
Discharge: HOME | End: 2022-10-26

## 2022-10-26 ENCOUNTER — HOSPITAL ENCOUNTER (OUTPATIENT)
Dept: PULMONOLOGY | Facility: HOSPITAL | Age: 57
Discharge: HOME | End: 2022-10-26

## 2022-10-26 VITALS — RESPIRATION RATE: 20 BRPM | OXYGEN SATURATION: 91 % | HEART RATE: 63 BPM

## 2022-10-26 DIAGNOSIS — J44.9 OBSTRUCTIVE CHRONIC BRONCHITIS WITHOUT EXACERBATION: Primary | ICD-10-CM

## 2022-10-26 DIAGNOSIS — R06.02 SHORTNESS OF BREATH: ICD-10-CM

## 2022-10-26 DIAGNOSIS — J43.9 PULMONARY EMPHYSEMA, UNSPECIFIED EMPHYSEMA TYPE: ICD-10-CM

## 2022-10-26 DIAGNOSIS — J44.9 OBSTRUCTIVE CHRONIC BRONCHITIS WITHOUT EXACERBATION: ICD-10-CM

## 2022-10-26 LAB
BDY SITE: ABNORMAL
HGB BLDA-MCNC: 17 G/DL (ref 13.5–17.5)
Lab: ABNORMAL
SAO2 % BLDCOA: 91.9 % (ref 94–99)

## 2022-10-26 PROCEDURE — 71046 X-RAY EXAM CHEST 2 VIEWS: CPT

## 2022-10-26 PROCEDURE — 82820 HEMOGLOBIN-OXYGEN AFFINITY: CPT

## 2022-10-26 PROCEDURE — 94726 PLETHYSMOGRAPHY LUNG VOLUMES: CPT

## 2022-10-26 PROCEDURE — 94729 DIFFUSING CAPACITY: CPT

## 2022-10-26 PROCEDURE — 94060 EVALUATION OF WHEEZING: CPT

## 2022-10-26 RX ORDER — ALBUTEROL SULFATE 2.5 MG/3ML
2.5 SOLUTION RESPIRATORY (INHALATION) ONCE AS NEEDED
Status: COMPLETED | OUTPATIENT
Start: 2022-10-26 | End: 2022-10-26

## 2022-10-26 RX ORDER — ALBUTEROL SULFATE 90 UG/1
4 AEROSOL, METERED RESPIRATORY (INHALATION) ONCE
Status: DISCONTINUED | OUTPATIENT
Start: 2022-10-26 | End: 2022-10-26

## 2022-10-26 RX ADMIN — ALBUTEROL SULFATE 2.5 MG: 2.5 SOLUTION RESPIRATORY (INHALATION) at 15:13

## 2023-05-31 ENCOUNTER — HOSPITAL ENCOUNTER (EMERGENCY)
Facility: HOSPITAL | Age: 58
Discharge: HOME | End: 2023-05-31
Attending: EMERGENCY MEDICINE
Payer: COMMERCIAL

## 2023-05-31 ENCOUNTER — APPOINTMENT (OUTPATIENT)
Dept: CT IMAGING | Facility: HOSPITAL | Age: 58
End: 2023-05-31
Payer: COMMERCIAL

## 2023-05-31 ENCOUNTER — APPOINTMENT (OUTPATIENT)
Dept: GENERAL RADIOLOGY | Facility: HOSPITAL | Age: 58
End: 2023-05-31
Payer: COMMERCIAL

## 2023-05-31 VITALS
HEIGHT: 62 IN | BODY MASS INDEX: 29.08 KG/M2 | TEMPERATURE: 98.2 F | WEIGHT: 158 LBS | OXYGEN SATURATION: 94 % | RESPIRATION RATE: 16 BRPM | SYSTOLIC BLOOD PRESSURE: 125 MMHG | DIASTOLIC BLOOD PRESSURE: 75 MMHG | HEART RATE: 80 BPM

## 2023-05-31 DIAGNOSIS — S33.5XXA LUMBAR SPRAIN, INITIAL ENCOUNTER: Primary | ICD-10-CM

## 2023-05-31 DIAGNOSIS — M47.816 ARTHRITIS OF LUMBAR SPINE: ICD-10-CM

## 2023-05-31 PROCEDURE — 25010000002 KETOROLAC TROMETHAMINE PER 15 MG: Performed by: EMERGENCY MEDICINE

## 2023-05-31 PROCEDURE — 72131 CT LUMBAR SPINE W/O DYE: CPT

## 2023-05-31 PROCEDURE — 99283 EMERGENCY DEPT VISIT LOW MDM: CPT

## 2023-05-31 PROCEDURE — 72100 X-RAY EXAM L-S SPINE 2/3 VWS: CPT

## 2023-05-31 PROCEDURE — 96372 THER/PROPH/DIAG INJ SC/IM: CPT

## 2023-05-31 RX ORDER — CYCLOBENZAPRINE HCL 10 MG
10 TABLET ORAL 3 TIMES DAILY PRN
Qty: 30 TABLET | Refills: 0 | Status: SHIPPED | OUTPATIENT
Start: 2023-05-31

## 2023-05-31 RX ORDER — KETOROLAC TROMETHAMINE 30 MG/ML
30 INJECTION, SOLUTION INTRAMUSCULAR; INTRAVENOUS ONCE
Status: COMPLETED | OUTPATIENT
Start: 2023-05-31 | End: 2023-05-31

## 2023-05-31 RX ORDER — CYCLOBENZAPRINE HCL 10 MG
10 TABLET ORAL ONCE
Status: COMPLETED | OUTPATIENT
Start: 2023-05-31 | End: 2023-05-31

## 2023-05-31 RX ORDER — KETOROLAC TROMETHAMINE 10 MG/1
10 TABLET, FILM COATED ORAL EVERY 6 HOURS PRN
Qty: 20 TABLET | Refills: 0 | Status: SHIPPED | OUTPATIENT
Start: 2023-05-31

## 2023-05-31 RX ADMIN — KETOROLAC TROMETHAMINE 30 MG: 30 INJECTION, SOLUTION INTRAMUSCULAR; INTRAVENOUS at 15:49

## 2023-05-31 RX ADMIN — CYCLOBENZAPRINE 10 MG: 10 TABLET, FILM COATED ORAL at 15:50

## 2023-05-31 NOTE — DISCHARGE INSTRUCTIONS
Follow-up with Yodit Reyes within 1 week.  Take the ketorolac and Flexeril as prescribed.  Apply ice to the affected area for 20 minutes every 2 hours while awake for 2 to 3 days, then moist heat.  Return to emergency department there is worsening pain, numbness, tingling, weakness, change in bladder or bowel function, worse in any way at all

## 2023-05-31 NOTE — ED PROVIDER NOTES
Subjective   History of Present Illness  History of Present Illness    Chief complaint: Back pain    Location: Low back    Quality/Severity: Moderate, sharp    Timing/Onset/Duration: Started last night    Modifying Factors: Hurts to move    Associated Symptoms: No numbness, tingling, weakness, or change in bladder or bowel function.  No fever chills    Narrative: This 57-year-old white female was watering flowers last night and felt a pop in her back and began experiencing low back pain.  Patient has no history of back surgery    PCP:Yodit Reyes APRN     Medication List      ASK your doctor about these medications    acetaminophen 325 MG tablet  Commonly known as: TYLENOL  Take 2 tablets by mouth Every 4 (Four) Hours As Needed for Mild Pain .     albuterol sulfate  (90 Base) MCG/ACT inhaler  Commonly known as: PROVENTIL HFA;VENTOLIN HFA;PROAIR HFA  Inhale 2 puffs Every 4 (Four) Hours As Needed for Wheezing or Shortness of Air.     fexofenadine 180 MG tablet  Commonly known as: ALLEGRA     levothyroxine 75 MCG tablet  Commonly known as: SYNTHROID, LEVOTHROID     Mucinex 600 MG 12 hr tablet  Generic drug: guaiFENesin     multivitamin with minerals tablet tablet     Trelegy Ellipta 100-62.5-25 MCG/INH inhaler  Generic drug: Fluticasone-Umeclidin-Vilant            Past Medical History:   Diagnosis Date   • Cancer     cervical 16 yrs ago   • Cholelithiasis november 2018   • COPD (chronic obstructive pulmonary disease) [id number]   • Disease of thyroid gland     HYPOTHYROID   • Gallstones     SCHEDULED FOR  LAP CHRISTIANO   • History of transfusion    • Migraine    • Umbilical hernia     SCHEDULED FOR REPAIR       No Known Allergies    Past Surgical History:   Procedure Laterality Date   • BLADDER REPAIR     • CHEST TUBE INSERTION      27 yrs ago   • CHOLECYSTECTOMY N/A 9/11/2019    Procedure: CHOLECYSTECTOMY LAPAROSCOPIC;  Surgeon: Antony Boogie MD;  Location: [facility];  Service: General   • DILATATION AND  CURETTAGE     • HYSTERECTOMY      LAP   • UMBILICAL HERNIA REPAIR N/A 2019    Procedure: UMBILICAL HERNIA REPAIR;  Surgeon: Antony Boogie MD;  Location: [facility];  Service: General       Family History   Problem Relation Age of Onset   • Lung disease Mother    • Dementia Father    • No Known Problems Sister    • Asthma Daughter    • Cancer Daughter    • Miscarriages / Stillbirths Daughter    • No Known Problems Son    • Diabetes Maternal Grandmother    • Heart disease Maternal Grandmother    • No Known Problems Sister    • No Known Problems Sister    • No Known Problems Sister        Social History     Socioeconomic History   • Marital status: Single   Tobacco Use   • Smoking status: Former     Packs/day: 1.00     Years: 25.00     Pack years: 25.00     Types: Cigarettes     Quit date: 2018     Years since quittin.5   • Smokeless tobacco: Never   Substance and Sexual Activity   • Alcohol use: No   • Drug use: No   • Sexual activity: Yes     Partners: Male     Birth control/protection: Other, None           Objective   Physical Exam  Vitals (The temperature is 98.2 °F, pulse 86, respirations 15, /83, room air pulse ox 92%) and nursing note reviewed.   Constitutional:       Appearance: Normal appearance.   HENT:      Head: Normocephalic and atraumatic.   Cardiovascular:      Rate and Rhythm: Normal rate and regular rhythm.      Pulses: Normal pulses.      Heart sounds: Normal heart sounds. No murmur heard.    No friction rub. No gallop.   Pulmonary:      Effort: Pulmonary effort is normal.      Breath sounds: Normal breath sounds.   Abdominal:      General: Abdomen is flat. Bowel sounds are normal. There is no distension.      Palpations: Abdomen is soft. There is no mass.      Tenderness: There is no abdominal tenderness. There is no guarding or rebound.      Hernia: No hernia is present.   Musculoskeletal:         General: No swelling or tenderness. Normal range of motion.      Comments: Is  tenderness upon palpation of the lumbar spine and paralumbar area.  There is no bony step-offs.  The spine is otherwise without tenderness or deformity   Skin:     General: Skin is warm and dry.   Neurological:      General: No focal deficit present.      Mental Status: She is alert and oriented to person, place, and time.      Cranial Nerves: No cranial nerve deficit.      Sensory: No sensory deficit.      Motor: No weakness.      Coordination: Coordination normal.         Procedures           ED Course      17:12 EDT, 05/31/23:  The patient was reassessed.  She feels better.  Her vital signs reviewed and are stable.  Neurological exam: Conscious alert oriented x4 with no focal deficits noted    17:12 EDT, 05/31/23:  The patient's diagnosis of lumbar sprain with degenerative arthritis of the lumbar spine was discussed with her.  The patient will be given a prescription for ketorolac and Flexeril.  She should rest.  She should ice the affected areas for 20 minutes every 2-3 hours while she is awake for 2 to 3 days, then apply moist heat.  The patient should follow-up with Yodit Reyes within 1 week.  The patient should return to the emergency department if there is increased pain, numbness, tingling, weakness, change in bladder or bowel function, worse in any way at all all the patient's questions were answered she will be discharged in good condition                                     MDM    Final diagnoses:   Lumbar sprain, initial encounter   Arthritis of lumbar spine       ED Disposition  ED Disposition     None          No follow-up provider specified.       Medication List      No changes were made to your prescriptions during this visit.          Fitz Torres MD  05/31/23 2330

## 2023-05-31 NOTE — Clinical Note
[place]  [facility] EMERGENCY DEPARTMENT  [address]  [place] [address]  Phone: [phone]    Irais Bolivar was seen and treated in our emergency department on 5/31/2023.  She may return to work on 06/03/2023.         Thank you for choosing [facility].    Fitz Torres MD

## 2023-07-26 ENCOUNTER — APPOINTMENT (OUTPATIENT)
Dept: BONE DENSITY | Facility: HOSPITAL | Age: 58
End: 2023-07-26

## 2023-07-26 DIAGNOSIS — M85.88 OTHER SPECIFIED DISORDERS OF BONE DENSITY AND STRUCTURE, OTHER SITE: ICD-10-CM

## 2023-07-26 PROCEDURE — 77080 DXA BONE DENSITY AXIAL: CPT

## 2023-09-11 ENCOUNTER — APPOINTMENT (OUTPATIENT)
Dept: GENERAL RADIOLOGY | Facility: HOSPITAL | Age: 58
End: 2023-09-11
Payer: COMMERCIAL

## 2023-09-11 ENCOUNTER — HOSPITAL ENCOUNTER (EMERGENCY)
Facility: HOSPITAL | Age: 58
Discharge: HOME | End: 2023-09-12
Attending: EMERGENCY MEDICINE
Payer: COMMERCIAL

## 2023-09-11 DIAGNOSIS — S29.011A INTERCOSTAL MUSCLE STRAIN, INITIAL ENCOUNTER: ICD-10-CM

## 2023-09-11 DIAGNOSIS — J20.9 ACUTE BRONCHITIS, UNSPECIFIED ORGANISM: Primary | ICD-10-CM

## 2023-09-11 PROCEDURE — 93005 ELECTROCARDIOGRAM TRACING: CPT

## 2023-09-11 PROCEDURE — 94799 UNLISTED PULMONARY SVC/PX: CPT

## 2023-09-11 PROCEDURE — 94640 AIRWAY INHALATION TREATMENT: CPT

## 2023-09-11 PROCEDURE — 99283 EMERGENCY DEPT VISIT LOW MDM: CPT

## 2023-09-11 PROCEDURE — 71046 X-RAY EXAM CHEST 2 VIEWS: CPT

## 2023-09-11 PROCEDURE — 93005 ELECTROCARDIOGRAM TRACING: CPT | Performed by: EMERGENCY MEDICINE

## 2023-09-11 PROCEDURE — 93010 ELECTROCARDIOGRAM REPORT: CPT | Performed by: INTERNAL MEDICINE

## 2023-09-11 RX ORDER — BENZONATATE 100 MG/1
200 CAPSULE ORAL ONCE
Status: COMPLETED | OUTPATIENT
Start: 2023-09-12 | End: 2023-09-12

## 2023-09-11 RX ORDER — AZITHROMYCIN 250 MG/1
500 TABLET, FILM COATED ORAL ONCE
Status: COMPLETED | OUTPATIENT
Start: 2023-09-12 | End: 2023-09-12

## 2023-09-11 RX ORDER — BENZONATATE 200 MG/1
200 CAPSULE ORAL 3 TIMES DAILY PRN
Qty: 21 CAPSULE | Refills: 0 | Status: SHIPPED | OUTPATIENT
Start: 2023-09-11 | End: 2023-09-18

## 2023-09-11 RX ORDER — AZITHROMYCIN 250 MG/1
TABLET, FILM COATED ORAL
Qty: 4 TABLET | Refills: 0 | Status: SHIPPED | OUTPATIENT
Start: 2023-09-11

## 2023-09-11 RX ORDER — IPRATROPIUM BROMIDE AND ALBUTEROL SULFATE 2.5; .5 MG/3ML; MG/3ML
3 SOLUTION RESPIRATORY (INHALATION) ONCE
Status: COMPLETED | OUTPATIENT
Start: 2023-09-11 | End: 2023-09-11

## 2023-09-11 RX ORDER — NABUMETONE 500 MG/1
500 TABLET, FILM COATED ORAL 2 TIMES DAILY PRN
Qty: 14 TABLET | Refills: 0 | Status: SHIPPED | OUTPATIENT
Start: 2023-09-11 | End: 2023-09-18

## 2023-09-11 RX ADMIN — IPRATROPIUM BROMIDE AND ALBUTEROL SULFATE 3 ML: .5; 3 SOLUTION RESPIRATORY (INHALATION) at 22:49

## 2023-09-11 NOTE — Clinical Note
[place]  [facility] EMERGENCY DEPARTMENT  [address] [place] [address]  Phone: [phone]    Irais Bolivar was seen and treated in our emergency department on 9/11/2023.  She may return to work on 09/14/2023.         Thank you for choosing [facility].    Forrest Haq MD

## 2023-09-12 VITALS
RESPIRATION RATE: 18 BRPM | BODY MASS INDEX: 28.71 KG/M2 | OXYGEN SATURATION: 93 % | DIASTOLIC BLOOD PRESSURE: 77 MMHG | HEIGHT: 62 IN | WEIGHT: 156 LBS | HEART RATE: 94 BPM | SYSTOLIC BLOOD PRESSURE: 109 MMHG | TEMPERATURE: 98.2 F

## 2023-09-12 LAB
QT INTERVAL: 370 MS
QTC INTERVAL: 454 MS

## 2023-09-12 RX ADMIN — AZITHROMYCIN MONOHYDRATE 500 MG: 250 TABLET ORAL at 00:00

## 2023-09-12 RX ADMIN — BENZONATATE 200 MG: 100 CAPSULE ORAL at 00:00

## 2023-09-12 NOTE — DISCHARGE INSTRUCTIONS
Medication as directed.  Follow-up with your PCP as above.  Return to ED for worsening symptoms, medical emergencies.

## 2023-09-12 NOTE — ED PROVIDER NOTES
"Subjective   History of Present Illness  Irais Bolivar is a 76-year-old white female who presents secondary to shortness of breath.  Still he has had a productive cough with green and yellow sputum for the past 2 to 3 days.  Approximately 8 PM tonight she coughed and felt a pop in left upper back.  This was followed by shortness of breath.  Patient states that her left lung \"hurts\".  Past medical history of COPD not on oxygen dependent.  Patient stopped smoking 4 - 5 years ago.  Patient presents for evaluation.    History provided by:  Patient    Review of Systems   Constitutional: Negative.  Negative for fever.   HENT: Negative.  Negative for rhinorrhea.    Eyes: Negative.  Negative for redness.   Respiratory:  Positive for cough and shortness of breath.    Cardiovascular:  Negative for chest pain.   Gastrointestinal:  Negative for abdominal pain.   Endocrine: Negative.    Genitourinary: Negative.  Negative for difficulty urinating.   Musculoskeletal: Negative.  Negative for back pain.   Skin: Negative.  Negative for color change.   Neurological: Negative.  Negative for syncope.   Hematological: Negative.    Psychiatric/Behavioral: Negative.     All other systems reviewed and are negative.    Past Medical History:   Diagnosis Date    Cancer     cervical 16 yrs ago    Cholelithiasis 11/2018    COPD (chronic obstructive pulmonary disease) 11/28/2018    Disease of thyroid gland     HYPOTHYROID    Gallstones     SCHEDULED FOR  LAP CHRISTIANO    History of transfusion     Migraine     Pneumothorax     right    Umbilical hernia     SCHEDULED FOR REPAIR       No Known Allergies    Past Surgical History:   Procedure Laterality Date    BLADDER REPAIR      CHEST TUBE INSERTION      27 yrs ago    CHOLECYSTECTOMY N/A 9/11/2019    Procedure: CHOLECYSTECTOMY LAPAROSCOPIC;  Surgeon: Antony Boogie MD;  Location: [facility];  Service: General    DILATATION AND CURETTAGE      HYSTERECTOMY      LAP    UMBILICAL HERNIA REPAIR N/A " 2019    Procedure: UMBILICAL HERNIA REPAIR;  Surgeon: Antony Boogie MD;  Location: [facility];  Service: General       Family History   Problem Relation Age of Onset    Lung disease Mother     Dementia Father     No Known Problems Sister     Asthma Daughter     Cancer Daughter     Miscarriages / Stillbirths Daughter     No Known Problems Son     Diabetes Maternal Grandmother     Heart disease Maternal Grandmother     No Known Problems Sister     No Known Problems Sister     No Known Problems Sister        Social History     Socioeconomic History    Marital status: Single   Tobacco Use    Smoking status: Former     Packs/day: 1.00     Years: 25.00     Pack years: 25.00     Types: Cigarettes     Quit date: 2018     Years since quittin.7    Smokeless tobacco: Never   Substance and Sexual Activity    Alcohol use: No    Drug use: No    Sexual activity: Yes     Partners: Male     Birth control/protection: Other, None           Objective   Physical Exam  Vitals and nursing note reviewed.   Constitutional:       General: She is not in acute distress.     Appearance: Normal appearance. She is well-developed. She is not ill-appearing, toxic-appearing or diaphoretic.      Comments: 58-year-old white male lying in bed.  Patient is a bit overweight.  She otherwise appears in good health.  Vital signs at triage are notable for respirate of 94 and oxygen saturation 92%.  Respiratory rate is now normal.  Oxygen saturation 94% on room air.  Patient is friendly and cooperative.  Companied by family members x2.   HENT:      Head: Normocephalic and atraumatic.      Right Ear: Tympanic membrane, ear canal and external ear normal.      Left Ear: Tympanic membrane, ear canal and external ear normal.      Nose: Nose normal.      Mouth/Throat:      Mouth: Mucous membranes are moist.      Pharynx: Oropharynx is clear.   Eyes:      Extraocular Movements: Extraocular movements intact.      Conjunctiva/sclera: Conjunctivae  normal.      Pupils: Pupils are equal, round, and reactive to light.   Cardiovascular:      Rate and Rhythm: Normal rate and regular rhythm.      Pulses: Normal pulses.      Heart sounds: Normal heart sounds. No murmur heard.    No friction rub. No gallop.   Pulmonary:      Effort: Pulmonary effort is normal.      Breath sounds: Normal breath sounds.   Abdominal:      General: Bowel sounds are normal. There is no distension.      Palpations: Abdomen is soft. There is no mass.      Tenderness: There is no abdominal tenderness. There is no guarding or rebound.      Hernia: No hernia is present.   Musculoskeletal:         General: Normal range of motion.      Cervical back: Normal range of motion and neck supple.        Back:       Comments: Mild tenderness to palpation posterior ribs as diagrammed.  No bony deformity.  No crepitus.  No subcu air.  No spinal tenderness.   Skin:     General: Skin is warm and dry.      Capillary Refill: Capillary refill takes less than 2 seconds.   Neurological:      General: No focal deficit present.      Mental Status: She is alert and oriented to person, place, and time.      Deep Tendon Reflexes: Reflexes are normal and symmetric.   Psychiatric:         Mood and Affect: Mood normal.         Behavior: Behavior normal.       Procedures       EKG 12-lead  Date 9/11/2023  Time 21: 45  Normal sinus rhythm  Normal rate  Normal axis  Normal intervals  Low voltage in multiple leads  Normal R wave progression  No ST elevation or depression  Nonspecific T wave flattening  Nonspecific EKG    ED Course  ED Course as of 09/12/23 0639   Mon Sep 11, 2023   2342 Breath sounds unremarkable.  Oxygen saturation 94% on room air.  Patient is in no respiratory distress [SS]   2351 Chest x-ray shows COPD changes.  Nothing acute.  EKG shows nonspecific T wave flattening.  I feel patient's symptoms secondary to bronchitis and possible intercostal muscle injury due to coughing.  Will prescribe antibiotics,  cough medication and NSAID for home.  Discussed at length with patient and her family all results, diagnoses, treatment follow-up.  Will DC home.    Prescription  1-Zithromax  2-Tessalon Perles  3-nabumetone [SS]      ED Course User Index  [SS] Forrest Haq MD      Labs Reviewed - No data to display    XR Chest 2 View    Result Date: 9/11/2023  Narrative: PA AND LATERAL CHEST, 9/11/2023 10:55 PM  HISTORY: felt pop on back and now SOB  COMPARISON: 10/26/2022  TECHNIQUE: PA and lateral upright chest series.  FINDINGS: Normal cardiomediastinal contours. COPD changes with bibasilar scarring again noted. No superimposed acute infiltrate. No pleural effusion. No pneumothorax.      Impression: Chronic changes without acute process.  This report was finalized on 9/11/2023 10:22 PM by Vasu Luis MD.       My differential diagnosis for dyspnea includes but is not limited to:  Asthma, COPD, COVID-19, pneumonia, pulmonary embolus, acute respiratory distress syndrome, RSV, pneumothorax, pleural effusion, pulmonary fibrosis, congestive heart failure, myocardial infarction, DKA, uremia, acidosis, sepsis, anemia, drug related, hyperventilation, CNS disease                                       Medical Decision Making  Problems Addressed:  Acute bronchitis, unspecified organism: complicated acute illness or injury  Intercostal muscle strain, initial encounter: complicated acute illness or injury    Amount and/or Complexity of Data Reviewed  Radiology: ordered.  ECG/medicine tests: ordered.    Risk  Prescription drug management.        Final diagnoses:   Acute bronchitis, unspecified organism   Intercostal muscle strain, initial encounter       ED Disposition  ED Disposition       ED Disposition   Discharge    Condition   Stable    Comment   --               Yodit Reyes, APRN  [address] [facility] [address]  [phone]    Schedule an appointment as soon as possible for a visit in 1 week  for continued or worsened  symptoms, Sooner if needed         Medication List        New Prescriptions      azithromycin 250 MG tablet  Commonly known as: Zithromax Z-Graeme  Take 1 tablet daily for 4 days. Began tomorrow.     benzonatate 200 MG capsule  Commonly known as: TESSALON  Take 1 capsule by mouth 3 (Three) Times a Day As Needed for Cough for up to 7 days.     nabumetone 500 MG tablet  Commonly known as: RELAFEN  Take 1 tablet by mouth 2 (Two) Times a Day As Needed (Pain) for up to 7 days.               Where to Get Your Medications        These medications were sent to [facility]/pharmacy #[id number] - [place], [place] - [address] [facility] - [phone]  - [phone] [address] [address]      Phone: [phone]   azithromycin 250 MG tablet  benzonatate 200 MG capsule  nabumetone 500 MG tablet            Forrest Haq MD  09/11/23 5458       Forrest Haq MD  09/12/23 7728

## 2024-03-18 ENCOUNTER — HOSPITAL ENCOUNTER (EMERGENCY)
Facility: HOSPITAL | Age: 59
Discharge: HOME | End: 2024-03-18
Attending: STUDENT IN AN ORGANIZED HEALTH CARE EDUCATION/TRAINING PROGRAM | Admitting: STUDENT IN AN ORGANIZED HEALTH CARE EDUCATION/TRAINING PROGRAM
Payer: COMMERCIAL

## 2024-03-18 ENCOUNTER — APPOINTMENT (OUTPATIENT)
Dept: CT IMAGING | Facility: HOSPITAL | Age: 59
End: 2024-03-18
Payer: COMMERCIAL

## 2024-03-18 VITALS
HEART RATE: 100 BPM | BODY MASS INDEX: 27.23 KG/M2 | HEIGHT: 62 IN | OXYGEN SATURATION: 93 % | RESPIRATION RATE: 16 BRPM | WEIGHT: 148 LBS | DIASTOLIC BLOOD PRESSURE: 89 MMHG | TEMPERATURE: 97.7 F | SYSTOLIC BLOOD PRESSURE: 160 MMHG

## 2024-03-18 DIAGNOSIS — N39.0 URINARY TRACT INFECTION WITHOUT HEMATURIA, SITE UNSPECIFIED: Primary | ICD-10-CM

## 2024-03-18 LAB
ALBUMIN SERPL-MCNC: 4 G/DL (ref 3.5–5.2)
ALBUMIN/GLOB SERPL: 1.1 G/DL
ALP SERPL-CCNC: 76 U/L (ref 39–117)
ALT SERPL W P-5'-P-CCNC: 14 U/L (ref 1–33)
ANION GAP SERPL CALCULATED.3IONS-SCNC: 9.4 MMOL/L (ref 5–15)
AST SERPL-CCNC: 16 U/L (ref 1–32)
BACTERIA UR QL AUTO: ABNORMAL /HPF
BASOPHILS # BLD AUTO: 0.05 10*3/MM3 (ref 0–0.2)
BASOPHILS NFR BLD AUTO: 0.5 % (ref 0–1.5)
BILIRUB SERPL-MCNC: 0.5 MG/DL (ref 0–1.2)
BILIRUB UR QL STRIP: NEGATIVE
BUN SERPL-MCNC: 7 MG/DL (ref 6–20)
BUN/CREAT SERPL: 9.9 (ref 7–25)
CALCIUM SPEC-SCNC: 8.9 MG/DL (ref 8.6–10.5)
CHLORIDE SERPL-SCNC: 104 MMOL/L (ref 98–107)
CLARITY UR: ABNORMAL
CO2 SERPL-SCNC: 27.6 MMOL/L (ref 22–29)
COLOR UR: ABNORMAL
CREAT SERPL-MCNC: 0.71 MG/DL (ref 0.57–1)
DEPRECATED RDW RBC AUTO: 45.6 FL (ref 37–54)
EGFRCR SERPLBLD CKD-EPI 2021: 99.3 ML/MIN/1.73
EOSINOPHIL # BLD AUTO: 0.26 10*3/MM3 (ref 0–0.4)
EOSINOPHIL NFR BLD AUTO: 2.4 % (ref 0.3–6.2)
ERYTHROCYTE [DISTWIDTH] IN BLOOD BY AUTOMATED COUNT: 13.3 % (ref 12.3–15.4)
GLOBULIN UR ELPH-MCNC: 3.7 GM/DL
GLUCOSE SERPL-MCNC: 111 MG/DL (ref 65–99)
GLUCOSE UR STRIP-MCNC: NEGATIVE MG/DL
HCT VFR BLD AUTO: 49.4 % (ref 34–46.6)
HGB BLD-MCNC: 16 G/DL (ref 12–15.9)
HGB UR QL STRIP.AUTO: ABNORMAL
HOLD SPECIMEN: NORMAL
HYALINE CASTS UR QL AUTO: ABNORMAL /LPF
IMM GRANULOCYTES # BLD AUTO: 0.04 10*3/MM3 (ref 0–0.05)
IMM GRANULOCYTES NFR BLD AUTO: 0.4 % (ref 0–0.5)
KETONES UR QL STRIP: NEGATIVE
LEUKOCYTE ESTERASE UR QL STRIP.AUTO: ABNORMAL
LIPASE SERPL-CCNC: 19 U/L (ref 13–60)
LYMPHOCYTES # BLD AUTO: 3.44 10*3/MM3 (ref 0.7–3.1)
LYMPHOCYTES NFR BLD AUTO: 32.1 % (ref 19.6–45.3)
MCH RBC QN AUTO: 30.4 PG (ref 26.6–33)
MCHC RBC AUTO-ENTMCNC: 32.4 G/DL (ref 31.5–35.7)
MCV RBC AUTO: 93.7 FL (ref 79–97)
MONOCYTES # BLD AUTO: 0.82 10*3/MM3 (ref 0.1–0.9)
MONOCYTES NFR BLD AUTO: 7.6 % (ref 5–12)
NEUTROPHILS NFR BLD AUTO: 57 % (ref 42.7–76)
NEUTROPHILS NFR BLD AUTO: 6.12 10*3/MM3 (ref 1.7–7)
NITRITE UR QL STRIP: NEGATIVE
NRBC BLD AUTO-RTO: 0 /100 WBC (ref 0–0.2)
PH UR STRIP.AUTO: 7 [PH] (ref 4.5–8)
PLATELET # BLD AUTO: 273 10*3/MM3 (ref 140–450)
PMV BLD AUTO: 9.1 FL (ref 6–12)
POTASSIUM SERPL-SCNC: 3.6 MMOL/L (ref 3.5–5.2)
PROT SERPL-MCNC: 7.7 G/DL (ref 6–8.5)
PROT UR QL STRIP: NEGATIVE
RBC # BLD AUTO: 5.27 10*6/MM3 (ref 3.77–5.28)
RBC # UR STRIP: ABNORMAL /HPF
REF LAB TEST METHOD: ABNORMAL
SODIUM SERPL-SCNC: 141 MMOL/L (ref 136–145)
SP GR UR STRIP: 1.02 (ref 1–1.03)
SQUAMOUS #/AREA URNS HPF: ABNORMAL /HPF
UROBILINOGEN UR QL STRIP: ABNORMAL
WBC # UR STRIP: ABNORMAL /HPF
WBC NRBC COR # BLD AUTO: 10.73 10*3/MM3 (ref 3.4–10.8)
WHOLE BLOOD HOLD COAG: NORMAL
WHOLE BLOOD HOLD SPECIMEN: NORMAL

## 2024-03-18 PROCEDURE — 36415 COLL VENOUS BLD VENIPUNCTURE: CPT

## 2024-03-18 PROCEDURE — 25510000001 IOPAMIDOL PER 1 ML: Performed by: STUDENT IN AN ORGANIZED HEALTH CARE EDUCATION/TRAINING PROGRAM

## 2024-03-18 PROCEDURE — 74177 CT ABD & PELVIS W/CONTRAST: CPT

## 2024-03-18 PROCEDURE — 96365 THER/PROPH/DIAG IV INF INIT: CPT

## 2024-03-18 PROCEDURE — 80053 COMPREHEN METABOLIC PANEL: CPT | Performed by: STUDENT IN AN ORGANIZED HEALTH CARE EDUCATION/TRAINING PROGRAM

## 2024-03-18 PROCEDURE — 83690 ASSAY OF LIPASE: CPT | Performed by: STUDENT IN AN ORGANIZED HEALTH CARE EDUCATION/TRAINING PROGRAM

## 2024-03-18 PROCEDURE — 85025 COMPLETE CBC W/AUTO DIFF WBC: CPT | Performed by: STUDENT IN AN ORGANIZED HEALTH CARE EDUCATION/TRAINING PROGRAM

## 2024-03-18 PROCEDURE — 25010000002 CEFTRIAXONE PER 250 MG: Performed by: STUDENT IN AN ORGANIZED HEALTH CARE EDUCATION/TRAINING PROGRAM

## 2024-03-18 PROCEDURE — 99285 EMERGENCY DEPT VISIT HI MDM: CPT

## 2024-03-18 PROCEDURE — 81001 URINALYSIS AUTO W/SCOPE: CPT | Performed by: STUDENT IN AN ORGANIZED HEALTH CARE EDUCATION/TRAINING PROGRAM

## 2024-03-18 RX ORDER — SULFAMETHOXAZOLE AND TRIMETHOPRIM 800; 160 MG/1; MG/1
1 TABLET ORAL 2 TIMES DAILY
Qty: 20 TABLET | Refills: 0 | Status: SHIPPED | OUTPATIENT
Start: 2024-03-18 | End: 2024-03-28

## 2024-03-18 RX ADMIN — CEFTRIAXONE 1000 MG: 1 INJECTION, POWDER, FOR SOLUTION INTRAMUSCULAR; INTRAVENOUS at 16:07

## 2024-03-18 RX ADMIN — IOPAMIDOL 100 ML: 755 INJECTION, SOLUTION INTRAVENOUS at 15:45

## 2024-03-18 NOTE — ED PROVIDER NOTES
Subjective   History of Present Illness  Pt is a 58 y.o. female with PMH as listed who presents for   Chief Complaint   Patient presents with    Abdominal Pain       Patient is a 58-year-old female with past medical history as listed below presents for abdominal pain for the past several days.  States that the pain started on Thursday and has been persistent since that time.  No specific relieving factors though she states that she does have slightly increased pain with certain movements and with any pressure applied to her abdomen.  Was seen by her PCP for abdominal pain today and was referred to the ED for evaluation of possible appendicitis.  Patient has past surgical history of hysterectomy and hernia repair.  No upper abdominal pain.  No nausea vomiting diaphoresis.  Last vomit was 3 hours ago was normal at that time.  No hematochezia or melena.    Review of Systems    Past Medical History:   Diagnosis Date    Cancer     cervical 16 yrs ago    Cholelithiasis 11/2018    COPD (chronic obstructive pulmonary disease) 11/28/2018    Disease of thyroid gland     HYPOTHYROID    Gallstones     SCHEDULED FOR  LAP CHRISTIANO    History of transfusion     Migraine     Pneumothorax     right    Umbilical hernia     SCHEDULED FOR REPAIR       No Known Allergies    Past Surgical History:   Procedure Laterality Date    BLADDER REPAIR      CHEST TUBE INSERTION      27 yrs ago    CHOLECYSTECTOMY N/A 9/11/2019    Procedure: CHOLECYSTECTOMY LAPAROSCOPIC;  Surgeon: Antony Boogie MD;  Location: [facility];  Service: General    DILATATION AND CURETTAGE      HYSTERECTOMY      LAP    UMBILICAL HERNIA REPAIR N/A 9/11/2019    Procedure: UMBILICAL HERNIA REPAIR;  Surgeon: Antony Boogie MD;  Location: [facility];  Service: General       Family History   Problem Relation Age of Onset    Lung disease Mother     Dementia Father     No Known Problems Sister     Asthma Daughter     Cancer Daughter     Miscarriages / Stillbirths Daughter     No  Known Problems Son     Diabetes Maternal Grandmother     Heart disease Maternal Grandmother     No Known Problems Sister     No Known Problems Sister     No Known Problems Sister        Social History     Socioeconomic History    Marital status: Single   Tobacco Use    Smoking status: Former     Current packs/day: 0.00     Average packs/day: 1 pack/day for 25.0 years (25.0 ttl pk-yrs)     Types: Cigarettes     Start date: 1993     Quit date: 2018     Years since quittin.3    Smokeless tobacco: Never   Substance and Sexual Activity    Alcohol use: No    Drug use: No    Sexual activity: Yes     Partners: Male     Birth control/protection: Other, None           Objective   Physical Exam  Constitutional:       Appearance: Normal appearance.   HENT:      Head: Normocephalic and atraumatic.      Mouth/Throat:      Mouth: Mucous membranes are moist.      Pharynx: Oropharynx is clear.   Eyes:      Conjunctiva/sclera: Conjunctivae normal.   Cardiovascular:      Rate and Rhythm: Normal rate.   Pulmonary:      Effort: Pulmonary effort is normal.   Abdominal:      General: Abdomen is flat.      Palpations: Abdomen is soft.      Tenderness: There is abdominal tenderness in the right lower quadrant.   Musculoskeletal:      Cervical back: Neck supple.   Skin:     General: Skin is warm and dry.   Neurological:      Mental Status: She is alert.   Psychiatric:         Mood and Affect: Mood normal.         Procedures           ED Course  ED Course as of 24 1606   Mon Mar 18, 2024   151 Patient is a 58-year-old female presents for right lower quad abdominal pain for the past 5 days.  States that she went to PCPs office and was referred here for rule out appendicitis.  On exam patient has right lower quadrant tenderness, no rebound.  Will obtain CBC, CMP, UA as well as lipase and CT on pelvis with contrast.  Patient offered pain medication, declined at this time. [JF]   1605 Labwork significant for bacteria and  white cells as well as leuk esterase in the urine, rest of workup unremarkable other than CT abdomen and pelvis showing possible evidence of ascending UTI.  Patient given dose of Rocephin in the ED and discharged with prescription for Bactrim.  Patient follow-up with PCP for recheck in the next few days.  Discussed patient results of workup and plan for discharge with antibiotic, she understands and agrees with plan of care.  All questions answered. [JF]      ED Course User Index  [JF] Donta Ruavlcaba MD                                             Medical Decision Making  My differential diagnosis for abdominal pain includes but is not limited to:  Gastritis, gastroenteritis, peptic ulcer disease, GERD, esophageal perforation, acute appendicitis, mesenteric adenitis, Meckel's diverticulum, epiploic appendagitis, diverticulitis, colon cancer, ulcerative colitis, Crohn's disease, intussusception, small bowel obstruction, adhesions, ischemic bowel, perforated viscus, ileus, obstipation, biliary colic, cholecystitis, cholelithiasis, Joshua-Saad Roderick, hepatitis, pancreatitis, common bile duct obstruction, cholangitis, bile leak, splenic trauma, splenic rupture, splenic infarction, splenic abscess, abdominal abscess, ascites, spontaneous bacterial peritonitis, hernia, UTI, cystitis,ureterolithiasis, urinary obstruction, ovarian cyst, torsion, pregnancy, ectopic pregnancy, PID, pelvic abscess, mittelschmerz, endometriosis, AAA, myocardial infarction, pneumonia, cancer, porphyria, DKA, medications, sickle cell, viral syndrome, zoster      Problems Addressed:  Urinary tract infection without hematuria, site unspecified: complicated acute illness or injury    Amount and/or Complexity of Data Reviewed  Labs: ordered.     Details: Lab work shows UTI with leuk esterase and white cells and bacteria in the urine, rest of lab work unremarkable for any acute findings.  Radiology: ordered.     Details: CT on pelvis shows possible  evidence of ascending UTI, no evidence of pyelonephritis at this time however.    Risk  Prescription drug management.        Final diagnoses:   Urinary tract infection without hematuria, site unspecified       ED Disposition  ED Disposition       ED Disposition   Discharge    Condition   Stable    Comment   --               Yodit Reyes, APRN  [address] [facility] [address] [phone]    Schedule an appointment as soon as possible for a visit in 2 days  For re-evaluation         Medication List        New Prescriptions      sulfamethoxazole-trimethoprim 800-160 MG per tablet  Commonly known as: BACTRIM DS,SEPTRA DS  Take 1 tablet by mouth 2 (Two) Times a Day for 10 days.               Where to Get Your Medications        These medications were sent to [facility]/pharmacy #[id number] - [place], [place] - [address] [facility] - [phone]  - [phone]   [address] [facility] [address]      Phone: [phone]   sulfamethoxazole-trimethoprim 800-160 MG per tablet            Donta Ruvalcaba MD  03/18/24 6612

## 2025-02-23 ENCOUNTER — HOSPITAL ENCOUNTER (EMERGENCY)
Facility: HOSPITAL | Age: 60
Discharge: HOME | End: 2025-02-23
Attending: EMERGENCY MEDICINE | Admitting: EMERGENCY MEDICINE
Payer: COMMERCIAL

## 2025-02-23 ENCOUNTER — APPOINTMENT (OUTPATIENT)
Dept: GENERAL RADIOLOGY | Facility: HOSPITAL | Age: 60
End: 2025-02-23
Payer: COMMERCIAL

## 2025-02-23 VITALS
BODY MASS INDEX: 27.07 KG/M2 | RESPIRATION RATE: 16 BRPM | OXYGEN SATURATION: 92 % | SYSTOLIC BLOOD PRESSURE: 127 MMHG | HEART RATE: 90 BPM | DIASTOLIC BLOOD PRESSURE: 83 MMHG | HEIGHT: 62 IN | TEMPERATURE: 98.6 F

## 2025-02-23 DIAGNOSIS — J18.9 PNEUMONIA OF RIGHT LOWER LOBE DUE TO INFECTIOUS ORGANISM: Primary | ICD-10-CM

## 2025-02-23 LAB
FLUAV RNA RESP QL NAA+PROBE: NOT DETECTED
FLUBV RNA RESP QL NAA+PROBE: NOT DETECTED
RSV RNA RESP QL NAA+PROBE: NOT DETECTED
SARS-COV-2 RNA RESP QL NAA+PROBE: NOT DETECTED

## 2025-02-23 PROCEDURE — 87637 SARSCOV2&INF A&B&RSV AMP PRB: CPT | Performed by: EMERGENCY MEDICINE

## 2025-02-23 PROCEDURE — 71045 X-RAY EXAM CHEST 1 VIEW: CPT

## 2025-02-23 PROCEDURE — 99283 EMERGENCY DEPT VISIT LOW MDM: CPT | Performed by: EMERGENCY MEDICINE

## 2025-02-23 RX ORDER — MONTELUKAST SODIUM 10 MG/1
10 TABLET ORAL NIGHTLY
COMMUNITY

## 2025-02-23 RX ORDER — CETIRIZINE HYDROCHLORIDE 10 MG/1
10 TABLET ORAL DAILY
COMMUNITY

## 2025-02-23 RX ORDER — FOLIC ACID 1 MG/1
1 TABLET ORAL DAILY
COMMUNITY

## 2025-02-23 RX ORDER — DOXYCYCLINE HYCLATE 100 MG/1
100 TABLET, DELAYED RELEASE ORAL 2 TIMES DAILY
Qty: 14 TABLET | Refills: 0 | Status: SHIPPED | OUTPATIENT
Start: 2025-02-23 | End: 2025-03-02

## 2025-02-23 RX ORDER — LEVOTHYROXINE SODIUM 125 UG/1
125 TABLET ORAL
COMMUNITY

## 2025-02-23 RX ORDER — ATORVASTATIN CALCIUM 20 MG/1
20 TABLET, FILM COATED ORAL DAILY
COMMUNITY

## 2025-02-23 RX ORDER — ASPIRIN 81 MG/1
81 TABLET, CHEWABLE ORAL DAILY
COMMUNITY

## 2025-02-23 RX ORDER — DOXYCYCLINE 100 MG/1
100 CAPSULE ORAL ONCE
Status: COMPLETED | OUTPATIENT
Start: 2025-02-23 | End: 2025-02-23

## 2025-02-23 RX ADMIN — DOXYCYCLINE 100 MG: 100 CAPSULE ORAL at 20:32

## 2025-02-23 NOTE — Clinical Note
[facility] EMERGENCY DEPARTMENT  [address] [place] [address]  Phone: [phone]    Irais Bolivar was seen and treated in our emergency department on 2/23/2025.  She may return to work on 02/26/2025.         Thank you for choosing [facility].    Kevin Vo MD

## 2025-02-23 NOTE — Clinical Note
[facility] EMERGENCY DEPARTMENT  [address] [place] [address]  Phone: [phone]    Irais Bolivar was seen and treated in our emergency department on 2/23/2025.  She may return to work on 02/26/2025.         Thank you for choosing [facility].    Kevin Vo MD

## 2025-02-24 NOTE — DISCHARGE INSTRUCTIONS
Your were evaluated for pneumonia.  Please take your antibiotics as prescribed.  Please continue to treat symptoms at home as needed.  You should follow up with your doctor.  Please return to the Emergency Department with any concerning symptoms.  Kevin Vo MD

## 2025-02-24 NOTE — ED PROVIDER NOTES
Subjective   History of Present Illness  60 yo F with PMHx copd presents with several days not feeling well.  Pt notes that she had respiratory infection in January and flu earlier this month.  Pt states she hasn't felt much better and people at work have had flu and pneumonia.  Pt reports some cough, congestion, decreased appetite, fatigue, and some shortness of breath.  Pt states she had a fever last week.  Pt has had some diarrhea.  No vomiting.        Review of Systems   Constitutional:  Positive for appetite change, fatigue and fever.   HENT:  Positive for congestion.    Respiratory:  Positive for cough and shortness of breath.    Cardiovascular:  Negative for chest pain.   Gastrointestinal:  Positive for diarrhea. Negative for abdominal pain and vomiting.       Past Medical History:   Diagnosis Date    Cancer     cervical 16 yrs ago    Cholelithiasis 11/2018    COPD (chronic obstructive pulmonary disease) 11/28/2018    Disease of thyroid gland     HYPOTHYROID    Gallstones     SCHEDULED FOR  LAP CHRISTIANO    History of transfusion     Migraine     Pneumothorax     right    Umbilical hernia     SCHEDULED FOR REPAIR       No Known Allergies    Past Surgical History:   Procedure Laterality Date    BLADDER REPAIR      CHEST TUBE INSERTION      27 yrs ago    CHOLECYSTECTOMY N/A 9/11/2019    Procedure: CHOLECYSTECTOMY LAPAROSCOPIC;  Surgeon: Antony Boogie MD;  Location: [facility] OR;  Service: General    DILATATION AND CURETTAGE      HYSTERECTOMY      LAP    UMBILICAL HERNIA REPAIR N/A 9/11/2019    Procedure: UMBILICAL HERNIA REPAIR;  Surgeon: Antony Boogie MD;  Location: [facility] OR;  Service: General       Family History   Problem Relation Age of Onset    Lung disease Mother     Dementia Father     No Known Problems Sister     Asthma Daughter     Cancer Daughter     Miscarriages / Stillbirths Daughter     No Known Problems Son     Diabetes Maternal Grandmother     Heart disease Maternal Grandmother     No Known  Problems Sister     No Known Problems Sister     No Known Problems Sister        Social History     Socioeconomic History    Marital status: Single   Tobacco Use    Smoking status: Former     Current packs/day: 0.00     Average packs/day: 1 pack/day for 25.0 years (25.0 ttl pk-yrs)     Types: Cigarettes     Start date: 1993     Quit date: 2018     Years since quittin.2    Smokeless tobacco: Never   Substance and Sexual Activity    Alcohol use: No    Drug use: No    Sexual activity: Yes     Partners: Male     Birth control/protection: Other, None           Objective   Physical Exam  Vitals and nursing note reviewed.   Constitutional:       Comments: Chronically ill-appearing   Cardiovascular:      Pulses: Normal pulses.   Pulmonary:      Effort: Pulmonary effort is normal. No respiratory distress.   Abdominal:      Tenderness: There is no abdominal tenderness.   Neurological:      General: No focal deficit present.         Procedures           ED Course                                                       Medical Decision Making  58 yo F with PMHx copd presents with a few days flu-like symptoms.  Examination unremarkable with ctab, no increased wob.  Will evaluate for acute process.    Cxr suggestive of RLL pneumonia, will treat empirically.  Work-up otherwise unremarkable.  No other evidence of sepsis, respiratory failure, or other emergent medical condition clinically.  Pt doing ok, appears comfortable, seems stable for home care.  No indication for hospitalization or further emergent management in this context.  Discussed results and poc for dc home, abx - first dose in ED, symptom management, follow-up, return precautions.      Problems Addressed:  Pneumonia of right lower lobe due to infectious organism: complicated acute illness or injury    Amount and/or Complexity of Data Reviewed  External Data Reviewed: labs, radiology and notes.  Labs: ordered.  Radiology: ordered and independent  interpretation performed.    Risk  Prescription drug management.        Final diagnoses:   Pneumonia of right lower lobe due to infectious organism       ED Disposition  ED Disposition       ED Disposition   Discharge    Condition   Stable    Comment   --               Yodit Reyes, APRN  [address] [facility] [address] [phone]          [facility] EMERGENCY DEPARTMENT  [address] [facility] [address] [phone]    As needed         Medication List        New Prescriptions      doxycycline 100 MG enteric coated tablet  Commonly known as: DORYX  Take 1 tablet by mouth 2 (Two) Times a Day for 7 days.               Where to Get Your Medications        These medications were sent to [facility]/pharmacy #[id number] - [place], [place] - [address] [facility] - [phone]  - [phone]   [address] [facility] [address]      Phone: [phone]   doxycycline 100 MG enteric coated tablet            Kevin Vo MD  02/24/25 0139

## 2025-03-22 ENCOUNTER — HOSPITAL ENCOUNTER (INPATIENT)
Facility: HOSPITAL | Age: 60
LOS: 2 days | Discharge: HOME | End: 2025-03-25
Attending: EMERGENCY MEDICINE | Admitting: HOSPITALIST
Payer: COMMERCIAL

## 2025-03-22 ENCOUNTER — APPOINTMENT (OUTPATIENT)
Dept: GENERAL RADIOLOGY | Facility: HOSPITAL | Age: 60
End: 2025-03-22
Payer: COMMERCIAL

## 2025-03-22 DIAGNOSIS — R09.02 HYPOXIA: Primary | ICD-10-CM

## 2025-03-22 DIAGNOSIS — J44.1 COPD EXACERBATION: ICD-10-CM

## 2025-03-22 DIAGNOSIS — J44.1 ACUTE EXACERBATION OF COPD WITH ASTHMA: ICD-10-CM

## 2025-03-22 PROBLEM — J44.9 COPD (CHRONIC OBSTRUCTIVE PULMONARY DISEASE): Status: ACTIVE | Noted: 2025-03-22

## 2025-03-22 LAB
ALBUMIN SERPL-MCNC: 3.9 G/DL (ref 3.5–5.2)
ALBUMIN/GLOB SERPL: 1.2 G/DL
ALP SERPL-CCNC: 68 U/L (ref 39–117)
ALT SERPL W P-5'-P-CCNC: 16 U/L (ref 1–33)
ANION GAP SERPL CALCULATED.3IONS-SCNC: 10.8 MMOL/L (ref 5–15)
AST SERPL-CCNC: 21 U/L (ref 1–32)
B PARAPERT DNA SPEC QL NAA+PROBE: NOT DETECTED
B PERT DNA SPEC QL NAA+PROBE: NOT DETECTED
BASOPHILS # BLD AUTO: 0.03 10*3/MM3 (ref 0–0.2)
BASOPHILS NFR BLD AUTO: 0.5 % (ref 0–1.5)
BILIRUB SERPL-MCNC: 0.4 MG/DL (ref 0–1.2)
BUN SERPL-MCNC: 8 MG/DL (ref 6–20)
BUN/CREAT SERPL: 13.8 (ref 7–25)
C PNEUM DNA NPH QL NAA+NON-PROBE: NOT DETECTED
CALCIUM SPEC-SCNC: 9.1 MG/DL (ref 8.6–10.5)
CHLORIDE SERPL-SCNC: 101 MMOL/L (ref 98–107)
CO2 SERPL-SCNC: 24.2 MMOL/L (ref 22–29)
CREAT SERPL-MCNC: 0.58 MG/DL (ref 0.57–1)
D-LACTATE SERPL-SCNC: 1.2 MMOL/L (ref 0.5–2)
DEPRECATED RDW RBC AUTO: 43 FL (ref 37–54)
EGFRCR SERPLBLD CKD-EPI 2021: 105 ML/MIN/1.73
EOSINOPHIL # BLD AUTO: 0.05 10*3/MM3 (ref 0–0.4)
EOSINOPHIL NFR BLD AUTO: 0.8 % (ref 0.3–6.2)
ERYTHROCYTE [DISTWIDTH] IN BLOOD BY AUTOMATED COUNT: 12.8 % (ref 12.3–15.4)
FLUAV SUBTYP SPEC NAA+PROBE: NOT DETECTED
FLUBV RNA ISLT QL NAA+PROBE: NOT DETECTED
GEN 5 1HR TROPONIN T REFLEX: <6 NG/L
GLOBULIN UR ELPH-MCNC: 3.2 GM/DL
GLUCOSE SERPL-MCNC: 100 MG/DL (ref 65–99)
HADV DNA SPEC NAA+PROBE: NOT DETECTED
HCOV 229E RNA SPEC QL NAA+PROBE: NOT DETECTED
HCOV HKU1 RNA SPEC QL NAA+PROBE: NOT DETECTED
HCOV NL63 RNA SPEC QL NAA+PROBE: NOT DETECTED
HCOV OC43 RNA SPEC QL NAA+PROBE: NOT DETECTED
HCT VFR BLD AUTO: 49.9 % (ref 34–46.6)
HGB BLD-MCNC: 16.4 G/DL (ref 12–15.9)
HMPV RNA NPH QL NAA+NON-PROBE: DETECTED
HPIV1 RNA ISLT QL NAA+PROBE: NOT DETECTED
HPIV2 RNA SPEC QL NAA+PROBE: NOT DETECTED
HPIV3 RNA NPH QL NAA+PROBE: NOT DETECTED
HPIV4 P GENE NPH QL NAA+PROBE: NOT DETECTED
IMM GRANULOCYTES # BLD AUTO: 0.02 10*3/MM3 (ref 0–0.05)
IMM GRANULOCYTES NFR BLD AUTO: 0.3 % (ref 0–0.5)
LYMPHOCYTES # BLD AUTO: 1.4 10*3/MM3 (ref 0.7–3.1)
LYMPHOCYTES NFR BLD AUTO: 21.1 % (ref 19.6–45.3)
M PNEUMO IGG SER IA-ACNC: NOT DETECTED
MAGNESIUM SERPL-MCNC: 1.9 MG/DL (ref 1.6–2.6)
MCH RBC QN AUTO: 30.2 PG (ref 26.6–33)
MCHC RBC AUTO-ENTMCNC: 32.9 G/DL (ref 31.5–35.7)
MCV RBC AUTO: 91.9 FL (ref 79–97)
MONOCYTES # BLD AUTO: 0.81 10*3/MM3 (ref 0.1–0.9)
MONOCYTES NFR BLD AUTO: 12.2 % (ref 5–12)
NEUTROPHILS NFR BLD AUTO: 4.33 10*3/MM3 (ref 1.7–7)
NEUTROPHILS NFR BLD AUTO: 65.1 % (ref 42.7–76)
NRBC BLD AUTO-RTO: 0 /100 WBC (ref 0–0.2)
NT-PROBNP SERPL-MCNC: 69.4 PG/ML (ref 0–900)
PLATELET # BLD AUTO: 165 10*3/MM3 (ref 140–450)
PMV BLD AUTO: 9 FL (ref 6–12)
POTASSIUM SERPL-SCNC: 3.8 MMOL/L (ref 3.5–5.2)
PROCALCITONIN SERPL-MCNC: 0.04 NG/ML (ref 0–0.25)
PROCALCITONIN SERPL-MCNC: 0.04 NG/ML (ref 0–0.25)
PROT SERPL-MCNC: 7.1 G/DL (ref 6–8.5)
QT INTERVAL: 333 MS
QTC INTERVAL: 457 MS
RBC # BLD AUTO: 5.43 10*6/MM3 (ref 3.77–5.28)
RHINOVIRUS RNA SPEC NAA+PROBE: NOT DETECTED
RSV RNA NPH QL NAA+NON-PROBE: NOT DETECTED
SARS-COV-2 RNA NPH QL NAA+NON-PROBE: NOT DETECTED
SODIUM SERPL-SCNC: 136 MMOL/L (ref 136–145)
TROPONIN T NUMERIC DELTA: NORMAL
TROPONIN T SERPL HS-MCNC: <6 NG/L
TSH SERPL DL<=0.05 MIU/L-ACNC: 0.66 UIU/ML (ref 0.27–4.2)
WBC NRBC COR # BLD AUTO: 6.64 10*3/MM3 (ref 3.4–10.8)

## 2025-03-22 PROCEDURE — 25010000002 ENOXAPARIN PER 10 MG: Performed by: HOSPITALIST

## 2025-03-22 PROCEDURE — G0378 HOSPITAL OBSERVATION PER HR: HCPCS

## 2025-03-22 PROCEDURE — 25010000002 METHYLPREDNISOLONE PER 125 MG: Performed by: HOSPITALIST

## 2025-03-22 PROCEDURE — 94799 UNLISTED PULMONARY SVC/PX: CPT

## 2025-03-22 PROCEDURE — 94640 AIRWAY INHALATION TREATMENT: CPT

## 2025-03-22 PROCEDURE — 93010 ELECTROCARDIOGRAM REPORT: CPT | Performed by: INTERNAL MEDICINE

## 2025-03-22 PROCEDURE — 99285 EMERGENCY DEPT VISIT HI MDM: CPT | Performed by: EMERGENCY MEDICINE

## 2025-03-22 PROCEDURE — 84145 PROCALCITONIN (PCT): CPT | Performed by: EMERGENCY MEDICINE

## 2025-03-22 PROCEDURE — 84484 ASSAY OF TROPONIN QUANT: CPT | Performed by: EMERGENCY MEDICINE

## 2025-03-22 PROCEDURE — 25010000002 METHYLPREDNISOLONE PER 40 MG: Performed by: EMERGENCY MEDICINE

## 2025-03-22 PROCEDURE — 36415 COLL VENOUS BLD VENIPUNCTURE: CPT

## 2025-03-22 PROCEDURE — 83880 ASSAY OF NATRIURETIC PEPTIDE: CPT | Performed by: EMERGENCY MEDICINE

## 2025-03-22 PROCEDURE — 93005 ELECTROCARDIOGRAM TRACING: CPT | Performed by: EMERGENCY MEDICINE

## 2025-03-22 PROCEDURE — 0202U NFCT DS 22 TRGT SARS-COV-2: CPT | Performed by: HOSPITALIST

## 2025-03-22 PROCEDURE — 84145 PROCALCITONIN (PCT): CPT | Performed by: HOSPITALIST

## 2025-03-22 PROCEDURE — 84443 ASSAY THYROID STIM HORMONE: CPT | Performed by: HOSPITALIST

## 2025-03-22 PROCEDURE — 83605 ASSAY OF LACTIC ACID: CPT | Performed by: EMERGENCY MEDICINE

## 2025-03-22 PROCEDURE — 85025 COMPLETE CBC W/AUTO DIFF WBC: CPT | Performed by: EMERGENCY MEDICINE

## 2025-03-22 PROCEDURE — 80053 COMPREHEN METABOLIC PANEL: CPT | Performed by: EMERGENCY MEDICINE

## 2025-03-22 PROCEDURE — 94761 N-INVAS EAR/PLS OXIMETRY MLT: CPT

## 2025-03-22 PROCEDURE — 99222 1ST HOSP IP/OBS MODERATE 55: CPT | Performed by: HOSPITALIST

## 2025-03-22 PROCEDURE — 71045 X-RAY EXAM CHEST 1 VIEW: CPT

## 2025-03-22 PROCEDURE — 83735 ASSAY OF MAGNESIUM: CPT | Performed by: EMERGENCY MEDICINE

## 2025-03-22 RX ORDER — IPRATROPIUM BROMIDE AND ALBUTEROL SULFATE 2.5; .5 MG/3ML; MG/3ML
3 SOLUTION RESPIRATORY (INHALATION) ONCE
Status: COMPLETED | OUTPATIENT
Start: 2025-03-22 | End: 2025-03-22

## 2025-03-22 RX ORDER — IPRATROPIUM BROMIDE AND ALBUTEROL SULFATE 2.5; .5 MG/3ML; MG/3ML
3 SOLUTION RESPIRATORY (INHALATION)
Status: DISCONTINUED | OUTPATIENT
Start: 2025-03-22 | End: 2025-03-24

## 2025-03-22 RX ORDER — ASPIRIN 81 MG
1 TABLET, DELAYED RELEASE (ENTERIC COATED) ORAL 2 TIMES DAILY
COMMUNITY

## 2025-03-22 RX ORDER — BENZONATATE 100 MG/1
200 CAPSULE ORAL 3 TIMES DAILY PRN
COMMUNITY
Start: 2025-03-12

## 2025-03-22 RX ORDER — DIPHENOXYLATE HYDROCHLORIDE AND ATROPINE SULFATE 2.5; .025 MG/1; MG/1
1 TABLET ORAL DAILY
COMMUNITY

## 2025-03-22 RX ORDER — GUAIFENESIN AND DEXTROMETHORPHAN HYDROBROMIDE 600; 30 MG/1; MG/1
1 TABLET, EXTENDED RELEASE ORAL EVERY 12 HOURS
Status: DISCONTINUED | OUTPATIENT
Start: 2025-03-22 | End: 2025-03-25 | Stop reason: HOSPADM

## 2025-03-22 RX ORDER — FOLIC ACID 1 MG/1
1 TABLET ORAL DAILY
Status: DISCONTINUED | OUTPATIENT
Start: 2025-03-22 | End: 2025-03-25 | Stop reason: HOSPADM

## 2025-03-22 RX ORDER — FLUTICASONE PROPIONATE 50 MCG
1 SPRAY, SUSPENSION (ML) NASAL DAILY
COMMUNITY
Start: 2019-07-21

## 2025-03-22 RX ORDER — FLUTICASONE PROPIONATE 50 MCG
1 SPRAY, SUSPENSION (ML) NASAL DAILY
Status: DISCONTINUED | OUTPATIENT
Start: 2025-03-22 | End: 2025-03-25 | Stop reason: HOSPADM

## 2025-03-22 RX ORDER — ERGOCALCIFEROL 1.25 MG/1
50000 CAPSULE, LIQUID FILLED ORAL
COMMUNITY

## 2025-03-22 RX ORDER — ASPIRIN 81 MG/1
81 TABLET, CHEWABLE ORAL DAILY
Status: DISCONTINUED | OUTPATIENT
Start: 2025-03-22 | End: 2025-03-25 | Stop reason: HOSPADM

## 2025-03-22 RX ORDER — METHYLPREDNISOLONE SODIUM SUCCINATE 125 MG/2ML
60 INJECTION, POWDER, LYOPHILIZED, FOR SOLUTION INTRAMUSCULAR; INTRAVENOUS EVERY 6 HOURS
Status: COMPLETED | OUTPATIENT
Start: 2025-03-22 | End: 2025-03-23

## 2025-03-22 RX ORDER — MONTELUKAST SODIUM 10 MG/1
10 TABLET ORAL NIGHTLY
Status: DISCONTINUED | OUTPATIENT
Start: 2025-03-22 | End: 2025-03-25 | Stop reason: HOSPADM

## 2025-03-22 RX ORDER — CETIRIZINE HYDROCHLORIDE 10 MG/1
10 TABLET ORAL DAILY
Status: DISCONTINUED | OUTPATIENT
Start: 2025-03-22 | End: 2025-03-25 | Stop reason: HOSPADM

## 2025-03-22 RX ORDER — MULTIPLE VITAMINS W/ MINERALS TAB 9MG-400MCG
1 TAB ORAL DAILY
Status: DISCONTINUED | OUTPATIENT
Start: 2025-03-22 | End: 2025-03-25 | Stop reason: HOSPADM

## 2025-03-22 RX ORDER — IPRATROPIUM BROMIDE AND ALBUTEROL SULFATE 2.5; .5 MG/3ML; MG/3ML
3 SOLUTION RESPIRATORY (INHALATION) 3 TIMES DAILY PRN
Status: ON HOLD | COMMUNITY
Start: 2025-03-12 | End: 2025-03-25

## 2025-03-22 RX ORDER — ACETAMINOPHEN 500 MG
500 TABLET ORAL EVERY 6 HOURS PRN
Status: DISCONTINUED | OUTPATIENT
Start: 2025-03-22 | End: 2025-03-25 | Stop reason: HOSPADM

## 2025-03-22 RX ORDER — ENOXAPARIN SODIUM 100 MG/ML
40 INJECTION SUBCUTANEOUS DAILY
Status: DISCONTINUED | OUTPATIENT
Start: 2025-03-22 | End: 2025-03-25 | Stop reason: HOSPADM

## 2025-03-22 RX ORDER — BENZONATATE 100 MG/1
200 CAPSULE ORAL 3 TIMES DAILY PRN
Status: DISCONTINUED | OUTPATIENT
Start: 2025-03-22 | End: 2025-03-25 | Stop reason: HOSPADM

## 2025-03-22 RX ORDER — FLUCONAZOLE 150 MG/1
150 TABLET ORAL DAILY
COMMUNITY

## 2025-03-22 RX ORDER — METHYLPREDNISOLONE SODIUM SUCCINATE 40 MG/ML
80 INJECTION, POWDER, LYOPHILIZED, FOR SOLUTION INTRAMUSCULAR; INTRAVENOUS ONCE
Status: COMPLETED | OUTPATIENT
Start: 2025-03-22 | End: 2025-03-22

## 2025-03-22 RX ORDER — ATORVASTATIN CALCIUM 20 MG/1
20 TABLET, FILM COATED ORAL DAILY
Status: DISCONTINUED | OUTPATIENT
Start: 2025-03-22 | End: 2025-03-25 | Stop reason: HOSPADM

## 2025-03-22 RX ORDER — ACETAMINOPHEN 500 MG
500 TABLET ORAL EVERY 6 HOURS PRN
COMMUNITY

## 2025-03-22 RX ADMIN — METHYLPREDNISOLONE SODIUM SUCCINATE 60 MG: 125 INJECTION, POWDER, LYOPHILIZED, FOR SOLUTION INTRAMUSCULAR; INTRAVENOUS at 08:11

## 2025-03-22 RX ADMIN — IPRATROPIUM BROMIDE AND ALBUTEROL SULFATE 3 ML: .5; 3 SOLUTION RESPIRATORY (INHALATION) at 14:20

## 2025-03-22 RX ADMIN — ASPIRIN 81 MG: 81 TABLET, CHEWABLE ORAL at 08:14

## 2025-03-22 RX ADMIN — Medication 1 TABLET: at 08:14

## 2025-03-22 RX ADMIN — GUAIFENESIN AND DEXTROMETHORPHAN HYDROBROMIDE 1 TABLET: 600; 30 TABLET, EXTENDED RELEASE ORAL at 20:40

## 2025-03-22 RX ADMIN — LEVOTHYROXINE SODIUM 200 MCG: 0.05 TABLET ORAL at 08:14

## 2025-03-22 RX ADMIN — IPRATROPIUM BROMIDE AND ALBUTEROL SULFATE 3 ML: .5; 3 SOLUTION RESPIRATORY (INHALATION) at 19:48

## 2025-03-22 RX ADMIN — ENOXAPARIN SODIUM 40 MG: 100 INJECTION SUBCUTANEOUS at 08:13

## 2025-03-22 RX ADMIN — MONTELUKAST 10 MG: 10 TABLET, FILM COATED ORAL at 20:40

## 2025-03-22 RX ADMIN — CETIRIZINE HYDROCHLORIDE 10 MG: 10 TABLET, FILM COATED ORAL at 08:14

## 2025-03-22 RX ADMIN — FOLIC ACID 1 MG: 1 TABLET ORAL at 08:14

## 2025-03-22 RX ADMIN — METHYLPREDNISOLONE SODIUM SUCCINATE 60 MG: 125 INJECTION, POWDER, LYOPHILIZED, FOR SOLUTION INTRAMUSCULAR; INTRAVENOUS at 14:08

## 2025-03-22 RX ADMIN — ATORVASTATIN CALCIUM 20 MG: 20 TABLET, FILM COATED ORAL at 08:14

## 2025-03-22 RX ADMIN — GUAIFENESIN AND DEXTROMETHORPHAN HYDROBROMIDE 1 TABLET: 600; 30 TABLET, EXTENDED RELEASE ORAL at 08:14

## 2025-03-22 RX ADMIN — METHYLPREDNISOLONE SODIUM SUCCINATE 60 MG: 125 INJECTION, POWDER, LYOPHILIZED, FOR SOLUTION INTRAMUSCULAR; INTRAVENOUS at 20:40

## 2025-03-22 RX ADMIN — METHYLPREDNISOLONE SODIUM SUCCINATE 80 MG: 40 INJECTION INTRAMUSCULAR; INTRAVENOUS at 03:07

## 2025-03-22 RX ADMIN — IPRATROPIUM BROMIDE AND ALBUTEROL SULFATE 3 ML: .5; 3 SOLUTION RESPIRATORY (INHALATION) at 07:51

## 2025-03-22 RX ADMIN — IPRATROPIUM BROMIDE AND ALBUTEROL SULFATE 3 ML: .5; 3 SOLUTION RESPIRATORY (INHALATION) at 03:09

## 2025-03-22 NOTE — PLAN OF CARE
"Goal Outcome Evaluation:  Plan of Care Reviewed With: patient        Progress: improving  Outcome Evaluation: Pt stated she is feeling \"better than yesterday\". Human metapneumovirus +. She is on 2L O2 nasal cannula, saturating 90-95%. She stated she does not feel SOA with activity or at rest; stated, she \"feels a wheezing\". Pt tachycardic at times 100's to 100/teens. She voiced no complaints during shift.                             "

## 2025-03-22 NOTE — ED NOTES
Nursing report ED to floor  Irais Bolivar  59 y.o.  female    HPI :   Chief Complaint   Patient presents with    Shortness of Breath       Admitting doctor:   Cameron Buitrago MD    Admitting diagnosis:   The primary encounter diagnosis was Hypoxia. A diagnosis of Acute exacerbation of COPD with asthma was also pertinent to this visit.    Code status:   Current Code Status       Date Active Code Status Order ID Comments User Context       Prior            Allergies:   Patient has no known allergies.    Isolation:   No active isolations    Intake and Output  No intake or output data in the 24 hours ending 03/22/25 0409    Weight:       03/22/25  0244   Weight: 65.8 kg (145 lb)       Most recent vitals:   Vitals:    03/22/25 0331 03/22/25 0345 03/22/25 0355 03/22/25 0401   BP:    114/96   BP Location:       Pulse: 113 114 114 109   Resp:       Temp:       TempSrc:       SpO2: 97% 91% (!) 86% 95%   Weight:       Height:           Active LDAs/IV Access:   Lines, Drains & Airways       Active LDAs       Name Placement date Placement time Site Days    Peripheral IV 03/22/25 0254 Left Antecubital 03/22/25  0254  Antecubital  less than 1                    Labs (abnormal labs have a star):   Labs Reviewed   COMPREHENSIVE METABOLIC PANEL - Abnormal; Notable for the following components:       Result Value    Glucose 100 (*)     All other components within normal limits    Narrative:     GFR Categories in Chronic Kidney Disease (CKD)      GFR Category          GFR (mL/min/1.73)    Interpretation  G1                     90 or greater         Normal or high (1)  G2                      60-89                Mild decrease (1)  G3a                   45-59                Mild to moderate decrease  G3b                   30-44                Moderate to severe decrease  G4                    15-29                Severe decrease  G5                    14 or less           Kidney failure          (1)In the absence of evidence of kidney  "disease, neither GFR category G1 or G2 fulfill the criteria for CKD.    eGFR calculation 2021 CKD-EPI creatinine equation, which does not include race as a factor   CBC WITH AUTO DIFFERENTIAL - Abnormal; Notable for the following components:    RBC 5.43 (*)     Hemoglobin 16.4 (*)     Hematocrit 49.9 (*)     Monocyte % 12.2 (*)     All other components within normal limits   LACTIC ACID, PLASMA - Normal   PROCALCITONIN - Normal    Narrative:     As a Marker for Sepsis (Non-Neonates):    1. <0.5 ng/mL represents a low risk of severe sepsis and/or septic shock.  2. >2 ng/mL represents a high risk of severe sepsis and/or septic shock.    As a Marker for Lower Respiratory Tract Infections that require antibiotic therapy:    PCT on Admission    Antibiotic Therapy       6-12 Hrs later    >0.5                Strongly Recommended  >0.25 - <0.5        Recommended   0.1 - 0.25          Discouraged              Remeasure/reassess PCT  <0.1                Strongly Discouraged     Remeasure/reassess PCT    As 28 day mortality risk marker: \"Change in Procalcitonin Result\" (>80% or <=80%) if Day 0 (or Day 1) and Day 4 values are available. Refer to http://www.[employer]-pct-calculator.com    Change in PCT <=80%  A decrease of PCT levels below or equal to 80% defines a positive change in PCT test result representing a higher risk for 28-day all-cause mortality of patients diagnosed with severe sepsis for septic shock.    Change in PCT >80%  A decrease of PCT levels of more than 80% defines a negative change in PCT result representing a lower risk for 28-day all-cause mortality of patients diagnosed with severe sepsis or septic shock.      TROPONIN - Normal    Narrative:     High Sensitive Troponin T Reference Range:  <14.0 ng/L- Negative Female for AMI  <22.0 ng/L- Negative Male for AMI  >=14 - Abnormal Female indicating possible myocardial injury.  >=22 - Abnormal Male indicating possible myocardial injury.   Clinicians would have to " utilize clinical acumen, EKG, Troponin, and serial changes to determine if it is an Acute Myocardial Infarction or myocardial injury due to an underlying chronic condition.        BNP (IN-HOUSE) - Normal    Narrative:     This assay is used as an aid in the diagnosis of individuals suspected of having heart failure. It can be used as an aid in the diagnosis of acute decompensated heart failure (ADHF) in patients presenting with signs and symptoms of ADHF to the emergency department (ED). In addition, NT-proBNP of <300 pg/mL indicates ADHF is not likely.    Age Range Result Interpretation  NT-proBNP Concentration (pg/mL:      <50             Positive            >450                   Gray                 300-450                    Negative             <300    50-75           Positive            >900                  Gray                300-900                  Negative            <300      >75             Positive            >1800                  Gray                300-1800                  Negative            <300   MAGNESIUM - Normal   HIGH SENSITIVITIY TROPONIN T 1HR   CBC AND DIFFERENTIAL    Narrative:     The following orders were created for panel order CBC & Differential.  Procedure                               Abnormality         Status                     ---------                               -----------         ------                     CBC Auto Differential[[id number]]        Abnormal            Final result                 Please view results for these tests on the individual orders.       Results       Procedure Component Value Ref Range Date/Time    High Sensitivity Troponin T 1Hr [[id number]] Collected: 03/22/25 0359    Order Status: Sent Specimen: Blood Updated: 03/22/25 0401    High Sensitivity Troponin T [[id number]]  (Normal) Collected: 03/22/25 0254    Order Status: Completed Specimen: Blood Updated: 03/22/25 0326     HS Troponin T <6 <14 ng/L     Narrative:      High Sensitive Troponin T  "Reference Range:  <14.0 ng/L- Negative Female for AMI  <22.0 ng/L- Negative Male for AMI  >=14 - Abnormal Female indicating possible myocardial injury.  >=22 - Abnormal Male indicating possible myocardial injury.   Clinicians would have to utilize clinical acumen, EKG, Troponin, and serial changes to determine if it is an Acute Myocardial Infarction or myocardial injury due to an underlying chronic condition.         Procalcitonin [[id number]]  (Normal) Collected: 03/22/25 0254    Order Status: Completed Specimen: Blood Updated: 03/22/25 0326     Procalcitonin 0.04 0.00 - 0.25 ng/mL     Narrative:      As a Marker for Sepsis (Non-Neonates):    1. <0.5 ng/mL represents a low risk of severe sepsis and/or septic shock.  2. >2 ng/mL represents a high risk of severe sepsis and/or septic shock.    As a Marker for Lower Respiratory Tract Infections that require antibiotic therapy:    PCT on Admission    Antibiotic Therapy       6-12 Hrs later    >0.5                Strongly Recommended  >0.25 - <0.5        Recommended   0.1 - 0.25          Discouraged              Remeasure/reassess PCT  <0.1                Strongly Discouraged     Remeasure/reassess PCT    As 28 day mortality risk marker: \"Change in Procalcitonin Result\" (>80% or <=80%) if Day 0 (or Day 1) and Day 4 values are available. Refer to http://www.[facility]-pct-calculator.com    Change in PCT <=80%  A decrease of PCT levels below or equal to 80% defines a positive change in PCT test result representing a higher risk for 28-day all-cause mortality of patients diagnosed with severe sepsis for septic shock.    Change in PCT >80%  A decrease of PCT levels of more than 80% defines a negative change in PCT result representing a lower risk for 28-day all-cause mortality of patients diagnosed with severe sepsis or septic shock.       BNP [[id number]]  (Normal) Collected: 03/22/25 0254    Order Status: Completed Specimen: Blood Updated: 03/22/25 0326     proBNP 69.4 0.0 - " 900.0 pg/mL     Narrative:      This assay is used as an aid in the diagnosis of individuals suspected of having heart failure. It can be used as an aid in the diagnosis of acute decompensated heart failure (ADHF) in patients presenting with signs and symptoms of ADHF to the emergency department (ED). In addition, NT-proBNP of <300 pg/mL indicates ADHF is not likely.    Age Range Result Interpretation  NT-proBNP Concentration (pg/mL:      <50             Positive            >450                   Gray                 300-450                    Negative             <300    50-75           Positive            >900                  Gray                300-900                  Negative            <300      >75             Positive            >1800                  Gray                300-1800                  Negative            <300    Comprehensive Metabolic Panel [[id number]]  (Abnormal) Collected: 03/22/25 0254    Order Status: Completed Specimen: Blood Updated: 03/22/25 0326     Glucose 100 65 - 99 mg/dL      BUN 8 6 - 20 mg/dL      Creatinine 0.58 0.57 - 1.00 mg/dL      Sodium 136 136 - 145 mmol/L      Potassium 3.8 3.5 - 5.2 mmol/L      Chloride 101 98 - 107 mmol/L      CO2 24.2 22.0 - 29.0 mmol/L      Calcium 9.1 8.6 - 10.5 mg/dL      Total Protein 7.1 6.0 - 8.5 g/dL      Albumin 3.9 3.5 - 5.2 g/dL      ALT (SGPT) 16 1 - 33 U/L      AST (SGOT) 21 1 - 32 U/L      Alkaline Phosphatase 68 39 - 117 U/L      Total Bilirubin 0.4 0.0 - 1.2 mg/dL      Globulin 3.2 gm/dL      A/G Ratio 1.2 g/dL      BUN/Creatinine Ratio 13.8 7.0 - 25.0      Anion Gap 10.8 5.0 - 15.0 mmol/L      eGFR 105.0 >60.0 mL/min/1.73     Narrative:      GFR Categories in Chronic Kidney Disease (CKD)      GFR Category          GFR (mL/min/1.73)    Interpretation  G1                     90 or greater         Normal or high (1)  G2                      60-89                Mild decrease (1)  G3a                   45-59                Mild to moderate  decrease  G3b                   30-44                Moderate to severe decrease  G4                    15-29                Severe decrease  G5                    14 or less           Kidney failure          (1)In the absence of evidence of kidney disease, neither GFR category G1 or G2 fulfill the criteria for CKD.    eGFR calculation 2021 CKD-EPI creatinine equation, which does not include race as a factor    Magnesium [[id number]]  (Normal) Collected: 03/22/25 0254    Order Status: Completed Specimen: Blood Updated: 03/22/25 0326     Magnesium 1.9 1.6 - 2.6 mg/dL     Lactic Acid, Plasma [[id number]]  (Normal) Collected: 03/22/25 0254    Order Status: Completed Specimen: Blood Updated: 03/22/25 0319     Lactate 1.2 0.5 - 2.0 mmol/L     CBC Auto Differential [[id number]]  (Abnormal) Collected: 03/22/25 0254    Order Status: Completed Specimen: Blood Updated: 03/22/25 0304     WBC 6.64 3.40 - 10.80 10*3/mm3      RBC 5.43 3.77 - 5.28 10*6/mm3      Hemoglobin 16.4 12.0 - 15.9 g/dL      Hematocrit 49.9 34.0 - 46.6 %      MCV 91.9 79.0 - 97.0 fL      MCH 30.2 26.6 - 33.0 pg      MCHC 32.9 31.5 - 35.7 g/dL      RDW 12.8 12.3 - 15.4 %      RDW-SD 43.0 37.0 - 54.0 fl      MPV 9.0 6.0 - 12.0 fL      Platelets 165 140 - 450 10*3/mm3      Neutrophil % 65.1 42.7 - 76.0 %      Lymphocyte % 21.1 19.6 - 45.3 %      Monocyte % 12.2 5.0 - 12.0 %      Eosinophil % 0.8 0.3 - 6.2 %      Basophil % 0.5 0.0 - 1.5 %      Immature Grans % 0.3 0.0 - 0.5 %      Neutrophils, Absolute 4.33 1.70 - 7.00 10*3/mm3      Lymphocytes, Absolute 1.40 0.70 - 3.10 10*3/mm3      Monocytes, Absolute 0.81 0.10 - 0.90 10*3/mm3      Eosinophils, Absolute 0.05 0.00 - 0.40 10*3/mm3      Basophils, Absolute 0.03 0.00 - 0.20 10*3/mm3      Immature Grans, Absolute 0.02 0.00 - 0.05 10*3/mm3      nRBC 0.0 0.0 - 0.2 /100 WBC     Blood Culture - Blood, [[id number]]     Order Status: Canceled Specimen: Blood     Blood Culture - Blood, [[id number]]     Order Status:  Canceled Specimen: Blood              EKG:   ECG 12 Lead Dyspnea   Preliminary Result   HEART PHBW=587  bpm   RR Chvhheip=222  ms   RI Fndjsyhh=167  ms   P Horizontal Axis=-45  deg   P Front Axis=74  deg   QRSD Interval=89  ms   QT Cctwmzeb=963  ms   SXlO=755  ms   QRS Axis=75  deg   T Wave Axis=57  deg   - OTHERWISE NORMAL ECG -   Sinus tachycardia   Date and Time of Study:2025 03:25:25          Meds given in ED:   Medications   ipratropium-albuterol (DUO-NEB) nebulizer solution 3 mL (3 mL Nebulization Given 3/22/25 030)   methylPREDNISolone sodium succinate (SOLU-Medrol) injection 80 mg (80 mg Intravenous Given 3/22/25 030)       Imaging results:  XR Chest 1 View  Result Date: 3/22/2025  Impression: Mild patchy airspace disease within the lung bases bilaterally superimposed on chronic appearing interstitial changes, likely related to a mild pneumonia. Electronically Signed: Audrey Crawford MD  3/22/2025 3:25 AM EDT  Workstation ID: [id number]      Ambulatory status:   - ad angel     Social issues:   Social History     Socioeconomic History    Marital status: Single   Tobacco Use    Smoking status: Former     Current packs/day: 0.00     Average packs/day: 1 pack/day for 25.0 years (25.0 ttl pk-yrs)     Types: Cigarettes     Start date: 1993     Quit date: 2018     Years since quittin.3    Smokeless tobacco: Never   Substance and Sexual Activity    Alcohol use: No    Drug use: No    Sexual activity: Yes     Partners: Male     Birth control/protection: Other, None       NIH Stroke Scale:         Elizabeth Freitas RN  25 04:09 EDT

## 2025-03-22 NOTE — PLAN OF CARE
Goal Outcome Evaluation:  Plan of Care Reviewed With: patient        Progress: no change  Outcome Evaluation: admitted from ER, VSS, 2L NC, respriatory viral panel pending.

## 2025-03-22 NOTE — H&P
[facility] GROUP HOSPITALIST     Yodit Reyes APRN    CHIEF COMPLAINT: Worsening dyspnea    HISTORY OF PRESENT ILLNESS:    Ms. Iqbal is a pleasant 59-year-old female who presents this evening with a 1 to 2-day history of worsening dyspnea.  She reports she has had a rough year thus far and that she had an upper respiratory tract infection in January, the flu in February, and that she was seen in our ER a week or 2 ago and diagnosed with pneumonia.  She also reports that her granddaughter tested positive for RSV.  When she was seen in the ER, she was started on a course of doxycycline which she completed.  She followed up with her PCP who gave her steroid shot and apparently had another course of an antibiotic.  She reports feeling a little bit better until her shortness of breath just was not responding to as needed breathing treatments.  She denies fever and chills.  She denies chest pain.  She denies nausea, vomiting, diarrhea.  She reports a good appetite and p.o. intake.  She does not smoke but is exposed to secondhand smoke.  She works at [employer].  She was at work this evening when she checked her sat and apparently was 66%.  EMS was summoned and she was brought to the ER on 2 L with a noted sat of 88%.      Past Medical History:   Diagnosis Date    Cancer     cervical 16 yrs ago    Cholelithiasis 11/2018    COPD (chronic obstructive pulmonary disease) 11/28/2018    Disease of thyroid gland     HYPOTHYROID    Gallstones     SCHEDULED FOR  LAP CHRISTIANO    History of transfusion     Migraine     Pneumothorax     right    Umbilical hernia     SCHEDULED FOR REPAIR     Past Surgical History:   Procedure Laterality Date    BLADDER REPAIR      CHEST TUBE INSERTION      27 yrs ago    CHOLECYSTECTOMY N/A 9/11/2019    Procedure: CHOLECYSTECTOMY LAPAROSCOPIC;  Surgeon: Antony Boogie MD;  Location: [facility];  Service: General    DILATATION AND CURETTAGE      HYSTERECTOMY      LAP    UMBILICAL  HERNIA REPAIR N/A 2019    Procedure: UMBILICAL HERNIA REPAIR;  Surgeon: Antony Boogie MD;  Location: [facility];  Service: General     Family History   Problem Relation Age of Onset    Lung disease Mother     Dementia Father     No Known Problems Sister     Asthma Daughter     Cancer Daughter     Miscarriages / Stillbirths Daughter     No Known Problems Son     Diabetes Maternal Grandmother     Heart disease Maternal Grandmother     No Known Problems Sister     No Known Problems Sister     No Known Problems Sister      Social History     Tobacco Use    Smoking status: Former     Current packs/day: 0.00     Average packs/day: 1 pack/day for 25.0 years (25.0 ttl pk-yrs)     Types: Cigarettes     Start date: 1993     Quit date: 2018     Years since quittin.3    Smokeless tobacco: Never   Substance Use Topics    Alcohol use: No    Drug use: No     Medications Prior to Admission   Medication Sig Dispense Refill Last Dose/Taking    acetaminophen (TYLENOL) 325 MG tablet Take 2 tablets by mouth Every 4 (Four) Hours As Needed for Mild Pain .       albuterol 108 (90 Base) MCG/ACT inhaler Inhale 2 puffs Every 4 (Four) Hours As Needed for Wheezing or Shortness of Air. 1 inhaler 1     aspirin 81 MG chewable tablet Chew 1 tablet Daily.       atorvastatin (LIPITOR) 20 MG tablet Take 1 tablet by mouth Daily.       azithromycin (Zithromax Z-Graeme) 250 MG tablet Take 1 tablet daily for 4 days. Began tomorrow. 4 tablet 0     cetirizine (zyrTEC) 10 MG tablet Take 1 tablet by mouth Daily.       cyclobenzaprine (FLEXERIL) 10 MG tablet Take 1 tablet by mouth 3 (Three) Times a Day As Needed for Muscle Spasms. 30 tablet 0     fexofenadine (ALLEGRA) 180 MG tablet Take 1 tablet by mouth Daily.       Fluticasone-Umeclidin-Vilant (TRELEGY ELLIPTA) 100-62.5-25 MCG/INH aerosol powder  Inhale 1 each Daily.       folic acid (FOLVITE) 1 MG tablet Take 1 tablet by mouth Daily.       guaiFENesin (MUCINEX) 600 MG 12 hr tablet Take  "2 tablets by mouth 2 (Two) Times a Day.       levothyroxine (SYNTHROID, LEVOTHROID) 125 MCG tablet Take 1 tablet by mouth Every Morning.       levothyroxine (SYNTHROID, LEVOTHROID) 75 MCG tablet TAKE 1 TABLET BY MOUTH EVERY DAY ON EMPTY STOMACH IN THE MORNING  1     montelukast (SINGULAIR) 10 MG tablet Take 1 tablet by mouth Every Night.       Multiple Vitamins-Minerals (MULTIVITAMIN WITH MINERALS) tablet tablet Take 1 tablet by mouth Daily.        Allergies:  Patient has no known allergies.    REVIEW OF SYSTEMS:  Please see the above history of present illness for pertinent positives and negatives.  The remainder of the patient's systems have been reviewed and are negative.    Vital Signs  Temp:  [98.1 °F (36.7 °C)-98.5 °F (36.9 °C)] 98.1 °F (36.7 °C)  Heart Rate:  [109-122] 114  Resp:  [20-24] 20  BP: (108-158)/(74-96) 140/85  Oxygen Therapy  SpO2: 92 %  Pulse Oximetry Type: Intermittent  Device (Oxygen Therapy): nasal cannula  Flow (L/min) (Oxygen Therapy): 2}  Body mass index is 24.89 kg/m².    Flowsheet Rows      Flowsheet Row First Filed Value   Admission Height 162.6 cm (64\") Documented at 03/22/2025 0244   Admission Weight 65.8 kg (145 lb) Documented at 03/22/2025 0244               Physical Exam:  Physical Exam   Constitutional: Patient appears older than stated age in no acute distress.  HEENT:   Head: Normocephalic and atraumatic.   Eyes:  Pupils are equal, round, and EOM are intact. Sclerae are anicteric and noninjected.  Cardiovascular: Regular rate, regular rhythm, S1 normal and S2 normal.  Exam reveals no gallop and no friction rub.  No murmur heard.  Radial pulses are 2+ and symmetric.  Pulmonary/Chest: Breath sounds are diminished and \"tight\" throughout all fields.  Respirations are nonlabored.   Abdominal: Soft. Bowel sounds are normal. There is no tenderness.   Musculoskeletal: Normal muscle tone  Extremities: No edema.   Neurological: Patient is alert and oriented to person, place, and time. " Cranial nerves II-XII are grossly intact with no focal deficits.    Emotional Behavior:    Judgment and Insight: Normal   Mental Status:  Alertness  Normal   Memory:  Appears intact   Mood and Affect:         Depression  None               Anxiety  None    Debilities:   Physical Weakness  None   Handicaps  None   Disabilities  None   Agitation  None     Results Review:    I reviewed the patient's new clinical results.  Lab Results (most recent)       Procedure Component Value Units Date/Time    High Sensitivity Troponin T 1Hr [[id number]] Collected: 03/22/25 0359    Specimen: Blood Updated: 03/22/25 0420     HS Troponin T <6 ng/L      Comment: Specimen hemolyzed.  Results may be falsely decreased.        Troponin T Numeric Delta --     Comment: Unable to calculate.       Narrative:      High Sensitive Troponin T Reference Range:  <14.0 ng/L- Negative Female for AMI  <22.0 ng/L- Negative Male for AMI  >=14 - Abnormal Female indicating possible myocardial injury.  >=22 - Abnormal Male indicating possible myocardial injury.   Clinicians would have to utilize clinical acumen, EKG, Troponin, and serial changes to determine if it is an Acute Myocardial Infarction or myocardial injury due to an underlying chronic condition.         High Sensitivity Troponin T [[id number]]  (Normal) Collected: 03/22/25 0254    Specimen: Blood Updated: 03/22/25 0326     HS Troponin T <6 ng/L     Narrative:      High Sensitive Troponin T Reference Range:  <14.0 ng/L- Negative Female for AMI  <22.0 ng/L- Negative Male for AMI  >=14 - Abnormal Female indicating possible myocardial injury.  >=22 - Abnormal Male indicating possible myocardial injury.   Clinicians would have to utilize clinical acumen, EKG, Troponin, and serial changes to determine if it is an Acute Myocardial Infarction or myocardial injury due to an underlying chronic condition.         Procalcitonin [[id number]]  (Normal) Collected: 03/22/25 0254    Specimen: Blood Updated:  "03/22/25 0326     Procalcitonin 0.04 ng/mL     Narrative:      As a Marker for Sepsis (Non-Neonates):    1. <0.5 ng/mL represents a low risk of severe sepsis and/or septic shock.  2. >2 ng/mL represents a high risk of severe sepsis and/or septic shock.    As a Marker for Lower Respiratory Tract Infections that require antibiotic therapy:    PCT on Admission    Antibiotic Therapy       6-12 Hrs later    >0.5                Strongly Recommended  >0.25 - <0.5        Recommended   0.1 - 0.25          Discouraged              Remeasure/reassess PCT  <0.1                Strongly Discouraged     Remeasure/reassess PCT    As 28 day mortality risk marker: \"Change in Procalcitonin Result\" (>80% or <=80%) if Day 0 (or Day 1) and Day 4 values are available. Refer to http://[email or web address]pct-calculator.[email or web address]    Change in PCT <=80%  A decrease of PCT levels below or equal to 80% defines a positive change in PCT test result representing a higher risk for 28-day all-cause mortality of patients diagnosed with severe sepsis for septic shock.    Change in PCT >80%  A decrease of PCT levels of more than 80% defines a negative change in PCT result representing a lower risk for 28-day all-cause mortality of patients diagnosed with severe sepsis or septic shock.       BNP [[id number]]  (Normal) Collected: 03/22/25 0254    Specimen: Blood Updated: 03/22/25 0326     proBNP 69.4 pg/mL     Narrative:      This assay is used as an aid in the diagnosis of individuals suspected of having heart failure. It can be used as an aid in the diagnosis of acute decompensated heart failure (ADHF) in patients presenting with signs and symptoms of ADHF to the emergency department (ED). In addition, NT-proBNP of <300 pg/mL indicates ADHF is not likely.    Age Range Result Interpretation  NT-proBNP Concentration (pg/mL:      <50             Positive            >450                   Gray                 300-450                    Negative             <300    50-75  "          Positive            >900                  Gray                300-900                  Negative            <300      >75             Positive            >1800                  Gray                300-1800                  Negative            <300    Comprehensive Metabolic Panel [[id number]]  (Abnormal) Collected: 03/22/25 0254    Specimen: Blood Updated: 03/22/25 0326     Glucose 100 mg/dL      BUN 8 mg/dL      Creatinine 0.58 mg/dL      Sodium 136 mmol/L      Potassium 3.8 mmol/L      Chloride 101 mmol/L      CO2 24.2 mmol/L      Calcium 9.1 mg/dL      Total Protein 7.1 g/dL      Albumin 3.9 g/dL      ALT (SGPT) 16 U/L      AST (SGOT) 21 U/L      Alkaline Phosphatase 68 U/L      Total Bilirubin 0.4 mg/dL      Globulin 3.2 gm/dL      A/G Ratio 1.2 g/dL      BUN/Creatinine Ratio 13.8     Anion Gap 10.8 mmol/L      eGFR 105.0 mL/min/1.73     Narrative:      GFR Categories in Chronic Kidney Disease (CKD)      GFR Category          GFR (mL/min/1.73)    Interpretation  G1                     90 or greater         Normal or high (1)  G2                      60-89                Mild decrease (1)  G3a                   45-59                Mild to moderate decrease  G3b                   30-44                Moderate to severe decrease  G4                    15-29                Severe decrease  G5                    14 or less           Kidney failure          (1)In the absence of evidence of kidney disease, neither GFR category G1 or G2 fulfill the criteria for CKD.    eGFR calculation 2021 CKD-EPI creatinine equation, which does not include race as a factor    Magnesium [[id number]]  (Normal) Collected: 03/22/25 0254    Specimen: Blood Updated: 03/22/25 0326     Magnesium 1.9 mg/dL     Lactic Acid, Plasma [[id number]]  (Normal) Collected: 03/22/25 0254    Specimen: Blood Updated: 03/22/25 0319     Lactate 1.2 mmol/L     CBC & Differential [[id number]]  (Abnormal) Collected: 03/22/25 0254    Specimen: Blood  Updated: 03/22/25 0304    Narrative:      The following orders were created for panel order CBC & Differential.  Procedure                               Abnormality         Status                     ---------                               -----------         ------                     CBC Auto Differential[[id number]]        Abnormal            Final result                 Please view results for these tests on the individual orders.    CBC Auto Differential [[id number]]  (Abnormal) Collected: 03/22/25 0254    Specimen: Blood Updated: 03/22/25 0304     WBC 6.64 10*3/mm3      RBC 5.43 10*6/mm3      Hemoglobin 16.4 g/dL      Hematocrit 49.9 %      MCV 91.9 fL      MCH 30.2 pg      MCHC 32.9 g/dL      RDW 12.8 %      RDW-SD 43.0 fl      MPV 9.0 fL      Platelets 165 10*3/mm3      Neutrophil % 65.1 %      Lymphocyte % 21.1 %      Monocyte % 12.2 %      Eosinophil % 0.8 %      Basophil % 0.5 %      Immature Grans % 0.3 %      Neutrophils, Absolute 4.33 10*3/mm3      Lymphocytes, Absolute 1.40 10*3/mm3      Monocytes, Absolute 0.81 10*3/mm3      Eosinophils, Absolute 0.05 10*3/mm3      Basophils, Absolute 0.03 10*3/mm3      Immature Grans, Absolute 0.02 10*3/mm3      nRBC 0.0 /100 WBC             Imaging Results (Most Recent)       Procedure Component Value Units Date/Time    XR Chest 1 View [[id number]] Collected: 03/22/25 0324     Updated: 03/22/25 0400    Narrative:      XR CHEST 1 VW    Date of Exam: 3/22/2025 3:10 AM EDT    Indication: cough    Comparison: 3/23/2025, 9/11/2023.    Findings:  Mild patchy airspace disease is present within the lung bases bilaterally superimposed on chronic appearing interstitial changes.. No pleural fluid. No pneumothorax. The pulmonary vasculature appears within normal limits. The cardiac and mediastinal   silhouette appear unremarkable. No acute osseous abnormality identified.      Impression:      Impression:  Mild patchy airspace disease within the lung bases bilaterally superimposed  on chronic appearing interstitial changes, likely related to a mild pneumonia.        Electronically Signed: Audrey Crawford MD    3/22/2025 3:25 AM EDT    Workstation ID: [id number]          reviewed    ECG/EMG Results (most recent)       Procedure Component Value Units Date/Time    ECG 12 Lead Dyspnea [[id number]] Collected: 03/22/25 0325     Updated: 03/22/25 0326     QT Interval 333 ms      QTC Interval 457 ms     Narrative:      HEART TWPE=021  bpm  RR Avkqdaqz=347  ms  ND Vwhuvfii=339  ms  P Horizontal Axis=-45  deg  P Front Axis=74  deg  QRSD Interval=89  ms  QT Bvpvaksd=524  ms  QGwH=101  ms  QRS Axis=75  deg  T Wave Axis=57  deg  - OTHERWISE NORMAL ECG -  Sinus tachycardia  Date and Time of Study:2025-03-22 03:25:25          reviewed    Assessment & Plan     Acute exacerbation of COPD with hypoxia due to suspected viral pneumonia: Clinical history suspicious for viral etiology given failure to improve after 2 rounds of antibiotics.  I also suspect what we see on chest x-ray is just clinical lag.  Procalcitonin is negative and she has no white count or thrombocytopenia.  For now, we will continue with nebulized bronchodilators as well as Solu-Medrol.  I am going to trend her procalcitonin with a repeat later this morning.  If it turns positive, will consider adding additional antibiotic therapy, but I suspect the the trend will remain negative given the clinical history.  Hypothyroidism: Will continue patient's current replacement dose.  Dyslipidemia: Will continue statin therapy.  History of polycythemia vera: She was followed by [facility] previously.  Appears her last clinic visit was in 2021.  Ms. Iqbal reports she has not followed up since then.  Hemoglobin is relatively at her baseline.    I discussed the patient's findings and my recommendations with patient and staff.     Cameron Buitrago MD  03/22/25  05:00 EDT

## 2025-03-22 NOTE — ED PROVIDER NOTES
Subjective   History of Present Illness  60 yo female w h/o COPD presents via EMS with dyspnea that worsened acutely this evening while she was working. She had checked her O2 sats and said they were 66% prior to EMS arriving and placing her on nasal cannula, on arrival she is 88% on 2L. She does not wear oxygen at home but has been using her breathing tx. Was seen late last month here in the ER, dx w RLL pneumonia, s/p influenza, and started on she thinks amoxicillin, which she continued to take but was not getting better and saw her PCP in the office, who then continued another round of oral abx and gave her a steroid shot. She reports she is having productive cough. Denies any other symptoms, no chest pain, no syncope, no edema, denies any cardiac hx.         Review of Systems   All other systems reviewed and are negative.      Past Medical History:   Diagnosis Date    Cancer     cervical 16 yrs ago    Cholelithiasis 11/2018    COPD (chronic obstructive pulmonary disease) 11/28/2018    Disease of thyroid gland     HYPOTHYROID    Gallstones     SCHEDULED FOR  LAP CHRISTIANO    History of transfusion     Migraine     Pneumothorax     right    Umbilical hernia     SCHEDULED FOR REPAIR       No Known Allergies    Past Surgical History:   Procedure Laterality Date    BLADDER REPAIR      CHEST TUBE INSERTION      27 yrs ago    CHOLECYSTECTOMY N/A 9/11/2019    Procedure: CHOLECYSTECTOMY LAPAROSCOPIC;  Surgeon: Antony Boogie MD;  Location: [facility] OR;  Service: General    DILATATION AND CURETTAGE      HYSTERECTOMY      LAP    UMBILICAL HERNIA REPAIR N/A 9/11/2019    Procedure: UMBILICAL HERNIA REPAIR;  Surgeon: Antony Boogie MD;  Location: [facility] OR;  Service: General       Family History   Problem Relation Age of Onset    Lung disease Mother     Dementia Father     No Known Problems Sister     Asthma Daughter     Cancer Daughter     Miscarriages / Stillbirths Daughter     No Known Problems Son     Diabetes Maternal  Grandmother     Heart disease Maternal Grandmother     No Known Problems Sister     No Known Problems Sister     No Known Problems Sister        Social History     Socioeconomic History    Marital status: Single   Tobacco Use    Smoking status: Former     Current packs/day: 0.00     Average packs/day: 1 pack/day for 25.0 years (25.0 ttl pk-yrs)     Types: Cigarettes     Start date: 1993     Quit date: 2018     Years since quittin.3    Smokeless tobacco: Never   Substance and Sexual Activity    Alcohol use: No    Drug use: No    Sexual activity: Yes     Partners: Male     Birth control/protection: Other, None           Objective   Physical Exam  Vitals and nursing note reviewed.   Constitutional:       General: She is not in acute distress.     Appearance: Normal appearance. She is not toxic-appearing.   HENT:      Head: Normocephalic.      Nose: Nose normal.      Mouth/Throat:      Mouth: Mucous membranes are moist.   Eyes:      Pupils: Pupils are equal, round, and reactive to light.   Cardiovascular:      Rate and Rhythm: Normal rate and regular rhythm.      Pulses: Normal pulses.      Heart sounds: Normal heart sounds.   Pulmonary:      Effort: Pulmonary effort is normal. No respiratory distress.      Breath sounds: Examination of the right-upper field reveals decreased breath sounds. Examination of the left-upper field reveals decreased breath sounds. Decreased breath sounds present. No wheezing or rales.      Comments: Pt on nasal cannula, mildly tachypneic but speaking full sentences.   Abdominal:      General: There is no distension.      Palpations: Abdomen is soft.      Tenderness: There is no abdominal tenderness.   Musculoskeletal:         General: No swelling, tenderness or deformity. Normal range of motion.      Cervical back: Normal range of motion.   Skin:     General: Skin is warm and dry.   Neurological:      General: No focal deficit present.      Mental Status: She is alert and  oriented to person, place, and time. Mental status is at baseline.   Psychiatric:         Mood and Affect: Mood normal.         Procedures           ED Course                                                       Medical Decision Making  58 yo female w h/o COPD presents w dyspnea  -she is hypoxic given new oxygen requirements, likely admission  -lactic acid was normal  -still tachycardic, if renal fx normal may get CT chest to rule out PE/other causes of failure to improve w antibiotics.  -was given nebs and steroid on arrival.     Amount and/or Complexity of Data Reviewed  Labs: ordered.     Details: Labs Reviewed  COMPREHENSIVE METABOLIC PANEL - Abnormal; Notable for the following components:     Glucose                       100 (*)             All other components within normal limits         Narrative: GFR Categories in Chronic Kidney Disease (CKD)                                      GFR Category          GFR (mL/min/1.73)    Interpretation                  G1                     90 or greater         Normal or high (1)                  G2                      60-89                Mild decrease (1)                  G3a                   45-59                Mild to moderate decrease                  G3b                   30-44                Moderate to severe decrease                  G4                    15-29                Severe decrease                  G5                    14 or less           Kidney failure                                          (1)In the absence of evidence of kidney disease, neither GFR category G1 or G2 fulfill the criteria for CKD.                                    eGFR calculation 2021 CKD-EPI creatinine equation, which does not include race as a factor  CBC WITH AUTO DIFFERENTIAL - Abnormal; Notable for the following components:     RBC                           5.43 (*)               Hemoglobin                    16.4 (*)               Hematocrit                    49.9 (*)  "              Monocyte %                    12.2 (*)            All other components within normal limits  LACTIC ACID, PLASMA - Normal  PROCALCITONIN - Normal         Narrative: As a Marker for Sepsis (Non-Neonates):                                    1. <0.5 ng/mL represents a low risk of severe sepsis and/or septic shock.                  2. >2 ng/mL represents a high risk of severe sepsis and/or septic shock.                                    As a Marker for Lower Respiratory Tract Infections that require antibiotic therapy:                                    PCT on Admission    Antibiotic Therapy       6-12 Hrs later                                    >0.5                Strongly Recommended                  >0.25 - <0.5        Recommended                   0.1 - 0.25          Discouraged              Remeasure/reassess PCT                  <0.1                Strongly Discouraged     Remeasure/reassess PCT                                    As 28 day mortality risk marker: \"Change in Procalcitonin Result\" (>80% or <=80%) if Day 0 (or Day 1) and Day 4 values are available. Refer to http://www.[employer]-pct-calculator.com                                    Change in PCT <=80%                  A decrease of PCT levels below or equal to 80% defines a positive change in PCT test result representing a higher risk for 28-day all-cause mortality of patients diagnosed with severe sepsis for septic shock.                                    Change in PCT >80%                  A decrease of PCT levels of more than 80% defines a negative change in PCT result representing a lower risk for 28-day all-cause mortality of patients diagnosed with severe sepsis or septic shock.                     TROPONIN - Normal         Narrative: High Sensitive Troponin T Reference Range:                  <14.0 ng/L- Negative Female for AMI                  <22.0 ng/L- Negative Male for AMI                  >=14 - Abnormal Female indicating " possible myocardial injury.                  >=22 - Abnormal Male indicating possible myocardial injury.                   Clinicians would have to utilize clinical acumen, EKG, Troponin, and serial changes to determine if it is an Acute Myocardial Infarction or myocardial injury due to an underlying chronic condition.                                       BNP (IN-HOUSE) - Normal         Narrative: This assay is used as an aid in the diagnosis of individuals suspected of having heart failure. It can be used as an aid in the diagnosis of acute decompensated heart failure (ADHF) in patients presenting with signs and symptoms of ADHF to the emergency department (ED). In addition, NT-proBNP of <300 pg/mL indicates ADHF is not likely.                                    Age Range Result Interpretation  NT-proBNP Concentration (pg/mL:                                                      <50             Positive            >450                                   Gray                 300-450                                    Negative             <300                                    50-75           Positive            >900                                  Jaime                300-900                                  Negative            <300                                                      >75             Positive            >1800                                  Jaime                300-1800                                  Negative            <300  MAGNESIUM - Normal  HIGH SENSITIVITIY TROPONIN T 1HR  CBC AND DIFFERENTIAL    Radiology: ordered.     Details: XR Chest 1 View   ED Interpretation    Rigjht lower lobe infiltrate       Final Result    Impression:    Mild patchy airspace disease within the lung bases bilaterally superimposed on chronic appearing interstitial changes, likely related to a mild pneumonia.                Electronically Signed: Audrey Crawford MD      3/22/2025 3:25 AM EDT      Workstation ID:  [id number]       ECG/medicine tests: ordered.  Discussion of management or test interpretation with external provider(s): D/w Dr. Meredith for admission, COPD     Risk  Prescription drug management.        Final diagnoses:   Hypoxia   Acute exacerbation of COPD with asthma       ED Disposition  ED Disposition       ED Disposition   Decision to Admit    Condition   --    Comment   Level of Care: Telemetry [5]   Diagnosis: COPD (chronic obstructive pulmonary disease) [993891]   Admitting Physician: NIHARIKA MEREDITH [[id number]]                 No follow-up provider specified.       Medication List      No changes were made to your prescriptions during this visit.            Roni Cortez MD  03/22/25 0328       Roni Cortez MD  03/22/25 0404

## 2025-03-23 PROCEDURE — 25010000002 METHYLPREDNISOLONE PER 125 MG: Performed by: HOSPITALIST

## 2025-03-23 PROCEDURE — 25010000002 ENOXAPARIN PER 10 MG: Performed by: HOSPITALIST

## 2025-03-23 PROCEDURE — 94799 UNLISTED PULMONARY SVC/PX: CPT

## 2025-03-23 PROCEDURE — 94761 N-INVAS EAR/PLS OXIMETRY MLT: CPT

## 2025-03-23 PROCEDURE — 25010000002 METHYLPREDNISOLONE PER 40 MG: Performed by: INTERNAL MEDICINE

## 2025-03-23 PROCEDURE — 99232 SBSQ HOSP IP/OBS MODERATE 35: CPT | Performed by: INTERNAL MEDICINE

## 2025-03-23 RX ORDER — CYCLOBENZAPRINE HCL 10 MG
10 TABLET ORAL 3 TIMES DAILY PRN
Status: DISCONTINUED | OUTPATIENT
Start: 2025-03-23 | End: 2025-03-24

## 2025-03-23 RX ORDER — METHYLPREDNISOLONE SODIUM SUCCINATE 40 MG/ML
40 INJECTION, POWDER, LYOPHILIZED, FOR SOLUTION INTRAMUSCULAR; INTRAVENOUS EVERY 12 HOURS
Status: DISCONTINUED | OUTPATIENT
Start: 2025-03-23 | End: 2025-03-25 | Stop reason: HOSPADM

## 2025-03-23 RX ORDER — LEVOTHYROXINE SODIUM 125 UG/1
125 TABLET ORAL
Status: DISCONTINUED | OUTPATIENT
Start: 2025-03-24 | End: 2025-03-25 | Stop reason: HOSPADM

## 2025-03-23 RX ORDER — BUDESONIDE 0.5 MG/2ML
0.5 INHALANT ORAL
Status: DISCONTINUED | OUTPATIENT
Start: 2025-03-23 | End: 2025-03-23

## 2025-03-23 RX ORDER — BUSPIRONE HYDROCHLORIDE 15 MG/1
15 TABLET ORAL 2 TIMES DAILY PRN
Status: DISCONTINUED | OUTPATIENT
Start: 2025-03-23 | End: 2025-03-25 | Stop reason: HOSPADM

## 2025-03-23 RX ADMIN — ATORVASTATIN CALCIUM 20 MG: 20 TABLET, FILM COATED ORAL at 08:21

## 2025-03-23 RX ADMIN — IPRATROPIUM BROMIDE AND ALBUTEROL SULFATE 3 ML: .5; 3 SOLUTION RESPIRATORY (INHALATION) at 03:00

## 2025-03-23 RX ADMIN — METHYLPREDNISOLONE SODIUM SUCCINATE 40 MG: 40 INJECTION, POWDER, FOR SOLUTION INTRAMUSCULAR; INTRAVENOUS at 12:03

## 2025-03-23 RX ADMIN — GUAIFENESIN AND DEXTROMETHORPHAN HYDROBROMIDE 1 TABLET: 600; 30 TABLET, EXTENDED RELEASE ORAL at 20:41

## 2025-03-23 RX ADMIN — Medication 1 TABLET: at 08:21

## 2025-03-23 RX ADMIN — BUSPIRONE HYDROCHLORIDE 15 MG: 15 TABLET ORAL at 15:46

## 2025-03-23 RX ADMIN — IPRATROPIUM BROMIDE AND ALBUTEROL SULFATE 3 ML: .5; 3 SOLUTION RESPIRATORY (INHALATION) at 19:54

## 2025-03-23 RX ADMIN — METHYLPREDNISOLONE SODIUM SUCCINATE 60 MG: 125 INJECTION, POWDER, LYOPHILIZED, FOR SOLUTION INTRAMUSCULAR; INTRAVENOUS at 02:45

## 2025-03-23 RX ADMIN — BENZONATATE 200 MG: 100 CAPSULE ORAL at 21:59

## 2025-03-23 RX ADMIN — IPRATROPIUM BROMIDE AND ALBUTEROL SULFATE 3 ML: .5; 3 SOLUTION RESPIRATORY (INHALATION) at 12:22

## 2025-03-23 RX ADMIN — ASPIRIN 81 MG: 81 TABLET, CHEWABLE ORAL at 08:21

## 2025-03-23 RX ADMIN — FLUTICASONE PROPIONATE 1 SPRAY: 50 SPRAY, METERED NASAL at 08:22

## 2025-03-23 RX ADMIN — CYCLOBENZAPRINE 10 MG: 10 TABLET, FILM COATED ORAL at 15:46

## 2025-03-23 RX ADMIN — LEVOTHYROXINE SODIUM 200 MCG: 0.05 TABLET ORAL at 05:20

## 2025-03-23 RX ADMIN — ENOXAPARIN SODIUM 40 MG: 100 INJECTION SUBCUTANEOUS at 08:23

## 2025-03-23 RX ADMIN — METHYLPREDNISOLONE SODIUM SUCCINATE 40 MG: 40 INJECTION, POWDER, FOR SOLUTION INTRAMUSCULAR; INTRAVENOUS at 21:59

## 2025-03-23 RX ADMIN — IPRATROPIUM BROMIDE AND ALBUTEROL SULFATE 3 ML: .5; 3 SOLUTION RESPIRATORY (INHALATION) at 07:09

## 2025-03-23 RX ADMIN — GUAIFENESIN AND DEXTROMETHORPHAN HYDROBROMIDE 1 TABLET: 600; 30 TABLET, EXTENDED RELEASE ORAL at 08:21

## 2025-03-23 RX ADMIN — FOLIC ACID 1 MG: 1 TABLET ORAL at 08:21

## 2025-03-23 RX ADMIN — MONTELUKAST 10 MG: 10 TABLET, FILM COATED ORAL at 20:41

## 2025-03-23 RX ADMIN — BUDESONIDE 0.5 MG: 0.5 INHALANT RESPIRATORY (INHALATION) at 11:15

## 2025-03-23 RX ADMIN — CETIRIZINE HYDROCHLORIDE 10 MG: 10 TABLET, FILM COATED ORAL at 08:21

## 2025-03-23 NOTE — CASE MANAGEMENT/SOCIAL WORK
Discharge Planning Assessment  [facility]     Patient Name: Irais Bolivar  MRN: [id number]  Today's Date: 3/23/2025    Admit Date: 3/22/2025    Plan: Return home alone, family to transport, needs nebulizer, follow for o2 needs   Discharge Needs Assessment       Row Name 03/23/25 0938       Living Environment    People in Home alone    Current Living Arrangements apartment;home    Potentially Unsafe Housing Conditions none    Primary Care Provided by self    Provides Primary Care For no one    Able to Return to Prior Arrangements yes       Resource/Environmental Concerns    Resource/Environmental Concerns none       Transition Planning    Patient/Family Anticipates Transition to home    Patient/Family Anticipated Services at Transition durable medical equipment    Transportation Anticipated family or friend will provide       Discharge Needs Assessment    Readmission Within the Last 30 Days no previous admission in last 30 days    Equipment Currently Used at Home nebulizer;pulse ox    Equipment Needed After Discharge oxygen;nebulizer  Pt currently using her neighbor's nebulizer and would like her own. Following for possible home oxygen.                   Discharge Plan       Row Name 03/23/25 0131       Plan    Plan Return home alone, family to transport, needs nebulizer, follow for o2 needs    Patient/Family in Agreement with Plan yes    Plan Comments Met with pt at bsd, introduced self and explained role. Verified facesheet, PCP is VAISHALI Espino. Pt uses [facility] pharmacy in [place], denies issues obtaining/affording medications. Pt lives alone in a first floor apartment. PTA pt was indepdent for ADLs and mobility. Pt does not drive, but son transports her to appts, etc. DME in home include pulse ox, and she has her neighbor's nebulizer - she would like to get a new nebulizer so she can return her neighbor's. Pt denies hx HH/rehab. At discharge, pt anticipates returning home with family to transport. She has no  preference on DME company and agreeable to [employer] for nebulizer and possibly oxygen. CCP will follow.                  Continued Care and Services - Admitted Since 3/22/2025       Durable Medical Equipment       Service Provider Request Status Services Address Phone Fax Patient Preferred    [facility] Pending - Request Sent -- [address] [place] [address] [phone] --                     Demographic Summary       Row Name 03/23/25 0937       General Information    Admission Type observation    Arrived From emergency department    Referral Source admission list    Reason for Consult discharge planning    Preferred Language English       Contact Information    Permission Granted to Share Info With                    Functional Status       Row Name 03/23/25 0937       Functional Status    Usual Activity Tolerance good    Current Activity Tolerance good       Functional Status, IADL    Medications independent    Meal Preparation independent    Housekeeping independent    Laundry independent    Shopping independent       Employment/    Employment Status employed full-time    Current or Previous Occupation [employer]                   Psychosocial    No documentation.                  Abuse/Neglect    No documentation.                  Legal    No documentation.                  Substance Abuse    No documentation.                  Patient Forms    No documentation.                     Elder Brown RN

## 2025-03-23 NOTE — PROGRESS NOTES
Patient Care Team:  Yodit Reyes APRN as PCP - General (Family Medicine)    Date: 3/23/2025  Patient Name: Irais Bolivar  : 1965  MRN: [id number]  Date of admission: 3/22/2025  Room/Bed: [facility]      Subjective   Subjective         Chief Complaint: f/u sob     Summary:Irais Bolivar is a 59 y.o. female who presents this evening with a 1 to 2-day history of worsening dyspnea.  She reports she has had a rough year thus far and that she had an upper respiratory tract infection in January, the flu in February, and that she was seen in our ER a week or 2 ago and diagnosed with pneumonia.  She also reports that her granddaughter tested positive for RSV.  When she was seen in the ER, she was started on a course of doxycycline which she completed.  She followed up with her PCP who gave her steroid shot and apparently had another course of an antibiotic.  She reports feeling a little bit better until her shortness of breath just was not responding to as needed breathing treatments.  She denies fever and chills.  She denies chest pain.  She denies nausea, vomiting, diarrhea.  She reports a good appetite and p.o. intake.  She does not smoke but is exposed to secondhand smoke.  She works at [employer].  She was at work this evening when she checked her sat and apparently was 66%.  EMS was summoned and she was brought to the ER on 2 L with a noted sat of 88%.     Interval Followup:   Patient is sitting up in bed, states she feels about the same. Nursing reports they tried to wean down oxygen but sats dropped and had to be increased .    Review of Systems   Respiratory:  Positive for cough and shortness of breath.          Objective   Objective       Vital Signs  Temp:  [97.5 °F (36.4 °C)-98.5 °F (36.9 °C)] 97.5 °F (36.4 °C)  Heart Rate:  [] 105  Resp:  [18-22] 20  BP: (109-119)/(62-79) 119/64  Oxygen Therapy  SpO2: 92 %  Pulse Oximetry Type: Continuous  Device (Oxygen Therapy): nasal cannula  Flow (L/min)  "(Oxygen Therapy): 1.5  Flowsheet Rows      Flowsheet Row First Filed Value   Admission Height 162.6 cm (64\") Documented at 03/22/2025 0244   Admission Weight 65.8 kg (145 lb) Documented at 03/22/2025 0244          Intake & Output (last 3 days)         03/20 0701  03/21 0700 03/21 0701  03/22 0700 03/22 0701  03/23 0700 03/23 0701  03/24 0700    P.O.   220 240    Total Intake(mL/kg)   220 (3.3) 240 (3.6)    Urine (mL/kg/hr)   1700 (1.1) 350 (1.1)    Stool   0     Total Output   1700 350    Net   -1480 -110            Urine Unmeasured Occurrence  1 x      Stool Unmeasured Occurrence   1 x           Lines, Drains & Airways       Active LDAs       Name Placement date Placement time Site Days    Peripheral IV 03/22/25 0254 Left Antecubital 03/22/25  0254  Antecubital  1                      Physical Exam  Vitals reviewed.   Constitutional:       General: She is not in acute distress.     Appearance: She is normal weight.   HENT:      Head: Normocephalic and atraumatic.   Cardiovascular:      Rate and Rhythm: Normal rate and regular rhythm.   Pulmonary:      Comments: Diminished aeration throughout, mild expiratory wheezing, on 2L NC  Musculoskeletal:      Right lower leg: No edema.      Left lower leg: No edema.   Skin:     General: Skin is warm and dry.   Neurological:      Mental Status: She is alert. Mental status is at baseline.   Psychiatric:         Mood and Affect: Mood normal.         Behavior: Behavior normal.           Results Review:      Results from last 7 days   Lab Units 03/22/25  0254   WBC 10*3/mm3 6.64   HEMOGLOBIN g/dL 16.4*   HEMATOCRIT % 49.9*   PLATELETS 10*3/mm3 165     Results from last 7 days   Lab Units 03/22/25  0254   SODIUM mmol/L 136   POTASSIUM mmol/L 3.8   CHLORIDE mmol/L 101   CO2 mmol/L 24.2   BUN mg/dL 8   CREATININE mg/dL 0.58   CALCIUM mg/dL 9.1   BILIRUBIN mg/dL 0.4   ALK PHOS U/L 68   ALT (SGPT) U/L 16   AST (SGOT) U/L 21   GLUCOSE mg/dL 100*       Results from last 7 days   Lab " "Units 03/22/25  0254   MAGNESIUM mg/dL 1.9       No results found for: \"BLOODCX\"    No results found for: \"MRSACX\"    No results found for: \"STOOLCX\"    No results found for: \"URINECX\"    No results found for: \"WOUNDCX\"    Imaging Results (Last 24 Hours)       ** No results found for the last 24 hours. **            I have personally reviewed the patient's new imaging and lab results.          ECG/EMG Results (most recent)       Procedure Component Value Units Date/Time    ECG 12 Lead Dyspnea [[id number]] Collected: 03/22/25 0325     Updated: 03/22/25 1655     QT Interval 333 ms      QTC Interval 457 ms     Narrative:      HEART SOTE=162  bpm  RR Bpanbrfq=208  ms  CT Iomampiv=753  ms  P Horizontal Axis=-45  deg  P Front Axis=74  deg  QRSD Interval=89  ms  QT Fvhrmfhg=489  ms  UJdI=447  ms  QRS Axis=75  deg  T Wave Axis=57  deg  - OTHERWISE NORMAL ECG -  Sinus tachycardia  No change from prior tracing  Electronically Signed By: Sharon Lundberg ([facility]) 2025-03-22 16:54:58  Date and Time of Study:2025-03-22 03:25:25                    Medication Review:     Scheduled Meds:aspirin, 81 mg, Oral, Daily  atorvastatin, 20 mg, Oral, Daily  budesonide, 0.5 mg, Nebulization, BID - RT  cetirizine, 10 mg, Oral, Daily  enoxaparin sodium, 40 mg, Subcutaneous, Daily  fluticasone, 1 spray, Nasal, Daily  folic acid, 1 mg, Oral, Daily  guaifenesin-dextromethorphan 600-30 mg, 1 tablet, Oral, Q12H  ipratropium-albuterol, 3 mL, Nebulization, Q6H While Awake - RT  levothyroxine, 200 mcg, Oral, Q AM  methylPREDNISolone sodium succinate, 40 mg, Intravenous, Q12H  montelukast, 10 mg, Oral, Nightly  multivitamin with minerals, 1 tablet, Oral, Daily      Continuous Infusions:   PRN Meds:.  acetaminophen    benzonatate    ipratropium-albuterol      I have reviewed the patient's current medication list    Assessment & Plan   Assessment / Plan       Active Hospital Problems    Diagnosis  POA    **COPD (chronic obstructive pulmonary disease) [J44.9]  " Yes       Acute exacerbation of COPD with hypoxia d/t viral pneumonia caused by human metapneumovirus   - Procalcitonin is negative and she has no white count or thrombocytopenia  - continue duonebs scheduled and prn  - continue mucinex, singulair, and flonase  - add pulmicort bid  - patient reports she was recently given nebulizer treatments but she does not have a nebulizer so will need to get this prior to discharge  - wean steroids to solumedrol 40mg IV bid   - continue to wean supplemental oxygen as tolerated     Hypothyroidism  - Will continue patient's current replacement dose.    Dyslipidemia  - continue statin     History of polycythemia vera  - She was followed by [facility] previously.  Appears her last clinic visit was in 2021.  Ms. Iqbal reports she has not followed up since then.   - Hemoglobin is relatively at her baseline.       DVT Prophylaxis  - enoxaparin     Code Status  - Full Code    Plan for disposition: home once respiratory status improved, hopefully in the next 24-48 hours     Arben Arias DO  03/23/25  11:58 EDT          Note disclaimer: At [facility], we believe that sharing information builds trust and better relationships. You are receiving this note because you recently visited [facility]. It is possible you will see health information before a provider has talked with you about it. This kind of information can be easy to misunderstand. To help you fully understand what it means for your health, we urge you to discuss this note with your provider.

## 2025-03-23 NOTE — PLAN OF CARE
Goal Outcome Evaluation:           Progress: improving  Outcome Evaluation: Pt VSS, Physician ordered medications for anxiety and muscle spasms. Pt given breathing treatment from respiratory, upon completion proceeded to become very flushed in face and hands with raised areas on face. Respiratory notified  who Discontinued treatment. Will continue to monitor.

## 2025-03-23 NOTE — PLAN OF CARE
Goal Outcome Evaluation:  Plan of Care Reviewed With: patient        Progress: improving  Outcome Evaluation: AO x4. VS stable, HR on the low 100s, on O2 1.5L NC. Minimal shortness of breath on exertion reported, expiratory wheezes noted on auscultation. Patient reports being unable to sleep all night. No complaints made up to this time.

## 2025-03-23 NOTE — DISCHARGE PLACEMENT REQUEST
"Johnny Bolivar \"Johnny Bolivar\" (59 y.o. Female)       Date of Birth   1965    Social Security Number       Address   [address] [facility] [place] [address]    Home Phone   [phone]    MRN   [id number]       [facility]    Marital Status   Single                            Admission Date   3/22/2025    Admission Type   Emergency    Admitting Provider   Cameron Buitrago MD    Attending Provider   Cameron Buitrago MD    Department, Room/Bed   [facility] MED SURG, 1417/1       Discharge Date       Discharge Disposition       Discharge Destination                                 Attending Provider: Cameron Buitrago MD    Allergies: No Known Allergies    Isolation: Enh Drop/Con, Contact   Infection: Human Metapneumovirus  (03/22/25)   Code Status: CPR    Ht: 162.6 cm (64\")   Wt: 65.8 kg (145 lb)    Admission Cmt: None   Principal Problem: COPD (chronic obstructive pulmonary disease) [J44.9]                   Active Insurance as of 3/22/2025       Primary Coverage       Payor Plan Insurance Group Employer/Plan Group    [id number]       Payor Plan Address Payor Plan Phone Number Payor Plan Fax Number Effective Dates    [address] [address] [phone]  9/1/2024 - None Entered    [facility] [id number]         Subscriber Name Subscriber Birth Date Member ID       JOHNNY BOLIVAR 1965 [id number]                     Emergency Contacts        (Rel.) Home Phone Work Phone Mobile Phone    candedon colon (Son) -- -- [phone]    Segun Brumfield (Daughter) -- -- [phone]                "

## 2025-03-24 PROCEDURE — 25010000002 METHYLPREDNISOLONE PER 40 MG: Performed by: INTERNAL MEDICINE

## 2025-03-24 PROCEDURE — 63710000001 REVEFENACIN 175 MCG/3ML SOLUTION: Performed by: INTERNAL MEDICINE

## 2025-03-24 PROCEDURE — 94799 UNLISTED PULMONARY SVC/PX: CPT

## 2025-03-24 PROCEDURE — 94761 N-INVAS EAR/PLS OXIMETRY MLT: CPT

## 2025-03-24 PROCEDURE — 94664 DEMO&/EVAL PT USE INHALER: CPT

## 2025-03-24 PROCEDURE — 25010000002 ENOXAPARIN PER 10 MG: Performed by: HOSPITALIST

## 2025-03-24 PROCEDURE — 99232 SBSQ HOSP IP/OBS MODERATE 35: CPT | Performed by: INTERNAL MEDICINE

## 2025-03-24 RX ORDER — ALBUTEROL SULFATE 0.83 MG/ML
2.5 SOLUTION RESPIRATORY (INHALATION) EVERY 4 HOURS PRN
Status: DISCONTINUED | OUTPATIENT
Start: 2025-03-24 | End: 2025-03-25 | Stop reason: HOSPADM

## 2025-03-24 RX ORDER — ARFORMOTEROL TARTRATE 15 UG/2ML
15 SOLUTION RESPIRATORY (INHALATION)
Status: DISCONTINUED | OUTPATIENT
Start: 2025-03-24 | End: 2025-03-24

## 2025-03-24 RX ADMIN — LEVOTHYROXINE SODIUM 125 MCG: 0.12 TABLET ORAL at 05:33

## 2025-03-24 RX ADMIN — IPRATROPIUM BROMIDE AND ALBUTEROL SULFATE 3 ML: .5; 3 SOLUTION RESPIRATORY (INHALATION) at 07:23

## 2025-03-24 RX ADMIN — ASPIRIN 81 MG: 81 TABLET, CHEWABLE ORAL at 10:50

## 2025-03-24 RX ADMIN — CETIRIZINE HYDROCHLORIDE 10 MG: 10 TABLET, FILM COATED ORAL at 10:51

## 2025-03-24 RX ADMIN — ARFORMOTEROL TARTRATE 15 MCG: 15 SOLUTION RESPIRATORY (INHALATION) at 13:39

## 2025-03-24 RX ADMIN — METHYLPREDNISOLONE SODIUM SUCCINATE 40 MG: 40 INJECTION, POWDER, FOR SOLUTION INTRAMUSCULAR; INTRAVENOUS at 10:51

## 2025-03-24 RX ADMIN — ATORVASTATIN CALCIUM 20 MG: 20 TABLET, FILM COATED ORAL at 10:50

## 2025-03-24 RX ADMIN — MONTELUKAST 10 MG: 10 TABLET, FILM COATED ORAL at 20:23

## 2025-03-24 RX ADMIN — METHYLPREDNISOLONE SODIUM SUCCINATE 40 MG: 40 INJECTION, POWDER, FOR SOLUTION INTRAMUSCULAR; INTRAVENOUS at 22:21

## 2025-03-24 RX ADMIN — GUAIFENESIN AND DEXTROMETHORPHAN HYDROBROMIDE 1 TABLET: 600; 30 TABLET, EXTENDED RELEASE ORAL at 20:22

## 2025-03-24 RX ADMIN — BUSPIRONE HYDROCHLORIDE 15 MG: 15 TABLET ORAL at 11:11

## 2025-03-24 RX ADMIN — IPRATROPIUM BROMIDE AND ALBUTEROL SULFATE 3 ML: .5; 3 SOLUTION RESPIRATORY (INHALATION) at 02:04

## 2025-03-24 RX ADMIN — GUAIFENESIN AND DEXTROMETHORPHAN HYDROBROMIDE 1 TABLET: 600; 30 TABLET, EXTENDED RELEASE ORAL at 10:50

## 2025-03-24 RX ADMIN — ENOXAPARIN SODIUM 40 MG: 100 INJECTION SUBCUTANEOUS at 10:51

## 2025-03-24 RX ADMIN — FLUTICASONE PROPIONATE 1 SPRAY: 50 SPRAY, METERED NASAL at 10:52

## 2025-03-24 RX ADMIN — REVEFENACIN 175 MCG: 175 SOLUTION RESPIRATORY (INHALATION) at 13:40

## 2025-03-24 RX ADMIN — BENZONATATE 200 MG: 100 CAPSULE ORAL at 11:11

## 2025-03-24 RX ADMIN — Medication 1 TABLET: at 10:50

## 2025-03-24 RX ADMIN — FOLIC ACID 1 MG: 1 TABLET ORAL at 10:51

## 2025-03-24 NOTE — PLAN OF CARE
Goal Outcome Evaluation:  Plan of Care Reviewed With: patient        Progress: improving  Outcome Evaluation: VSS, 3L O2 NC slight cough, Pt family brought in trelegy. Needs to be taken to pharmacy in the am.

## 2025-03-24 NOTE — PAYOR COMM NOTE
"Johnny Bolivar \"Johnny Bolivar\" (59 y.o. Female)       Date of Birth   1965    Social Security Number       Address   [address] [facility] [place] [address]    Home Phone   [phone]    MRN   [id number]       [facility]    Marital Status   Single                            Admission Date   3/22/2025    Admission Type   Emergency    Admitting Provider   Cameron Buitrago MD    Attending Provider   Cameron Buitrago MD    Department, Room/Bed   [facility] MED SURG, 1417/1       Discharge Date       Discharge Disposition       Discharge Destination                                 Attending Provider: Cameron Buitrago MD    Allergies: Budesonide    Isolation: Contact   Infection: Human Metapneumovirus  (03/22/25)   Code Status: CPR    Ht: 162.6 cm (64\")   Wt: 65.8 kg (145 lb)    Admission Cmt: None   Principal Problem: COPD (chronic obstructive pulmonary disease) [J44.9]                   Active Insurance as of 3/22/2025       Primary Coverage       Payor Plan Insurance Group Employer/Plan Group    [facility] [id number]       Payor Plan Address Payor Plan Phone Number Payor Plan Fax Number Effective Dates    [address] [address] [phone]  9/1/2024 - None Entered    [facility] [id number]         Subscriber Name Subscriber Birth Date Member ID       JOHNNY BOLIVAR 1965 [id number]                     Emergency Contacts        (Rel.) Home Phone Work Phone Mobile Phone    candedon (Son) -- -- [phone]    ChelseyedwardmaliSegun (Daughter) -- -- [phone]              Insurance Information                  [facility] Phone: [phone]    Subscriber: Johnny Bolivar Subscriber#: [id number]    Group#: [id number] Precert#: --    Authorization#: -- Effective Date: --          Problem List           Codes Noted - Resolved       Hospital    * (Principal) COPD (chronic obstructive pulmonary disease) ICD-10-CM: J44.9  ICD-9-CM: 496 3/22/2025 - Present       " Non-Hospital    Calculus of gallbladder without cholecystitis without obstruction ICD-10-CM: K80.20  ICD-9-CM: 574.20 9/4/2019 - Present    Umbilical hernia without obstruction and without gangrene ICD-10-CM: K42.9  ICD-9-CM: 553.1 9/4/2019 - Present    COPD exacerbation ICD-10-CM: J44.1  ICD-9-CM: 491.21 11/28/2018 - Present        History & Physical        Cameron Buitrago MD at 03/22/25 0500          [facility] HOSPITALIST     Yodit Reyes APRN    CHIEF COMPLAINT: Worsening dyspnea    HISTORY OF PRESENT ILLNESS:    Ms. Iqbal is a pleasant 59-year-old female who presents this evening with a 1 to 2-day history of worsening dyspnea.  She reports she has had a rough year thus far and that she had an upper respiratory tract infection in January, the flu in February, and that she was seen in our ER a week or 2 ago and diagnosed with pneumonia.  She also reports that her granddaughter tested positive for RSV.  When she was seen in the ER, she was started on a course of doxycycline which she completed.  She followed up with her PCP who gave her steroid shot and apparently had another course of an antibiotic.  She reports feeling a little bit better until her shortness of breath just was not responding to as needed breathing treatments.  She denies fever and chills.  She denies chest pain.  She denies nausea, vomiting, diarrhea.  She reports a good appetite and p.o. intake.  She does not smoke but is exposed to secondhand smoke.  She works at [employer].  She was at work this evening when she checked her sat and apparently was 66%.  EMS was summoned and she was brought to the ER on 2 L with a noted sat of 88%.      Past Medical History:   Diagnosis Date    Cancer     cervical 16 yrs ago    Cholelithiasis 11/2018    COPD (chronic obstructive pulmonary disease) 11/28/2018    Disease of thyroid gland     HYPOTHYROID    Gallstones     SCHEDULED FOR  LAP CHRISTIANO    History of transfusion     Migraine      Pneumothorax     right    Umbilical hernia     SCHEDULED FOR REPAIR     Past Surgical History:   Procedure Laterality Date    BLADDER REPAIR      CHEST TUBE INSERTION      27 yrs ago    CHOLECYSTECTOMY N/A 2019    Procedure: CHOLECYSTECTOMY LAPAROSCOPIC;  Surgeon: Antony Boogie MD;  Location: [facility] OR;  Service: General    DILATATION AND CURETTAGE      HYSTERECTOMY      LAP    UMBILICAL HERNIA REPAIR N/A 2019    Procedure: UMBILICAL HERNIA REPAIR;  Surgeon: Antony Boogie MD;  Location: [facility] OR;  Service: General     Family History   Problem Relation Age of Onset    Lung disease Mother     Dementia Father     No Known Problems Sister     Asthma Daughter     Cancer Daughter     Miscarriages / Stillbirths Daughter     No Known Problems Son     Diabetes Maternal Grandmother     Heart disease Maternal Grandmother     No Known Problems Sister     No Known Problems Sister     No Known Problems Sister      Social History     Tobacco Use    Smoking status: Former     Current packs/day: 0.00     Average packs/day: 1 pack/day for 25.0 years (25.0 ttl pk-yrs)     Types: Cigarettes     Start date: 1993     Quit date: 2018     Years since quittin.3    Smokeless tobacco: Never   Substance Use Topics    Alcohol use: No    Drug use: No     Medications Prior to Admission   Medication Sig Dispense Refill Last Dose/Taking    acetaminophen (TYLENOL) 325 MG tablet Take 2 tablets by mouth Every 4 (Four) Hours As Needed for Mild Pain .       albuterol 108 (90 Base) MCG/ACT inhaler Inhale 2 puffs Every 4 (Four) Hours As Needed for Wheezing or Shortness of Air. 1 inhaler 1     aspirin 81 MG chewable tablet Chew 1 tablet Daily.       atorvastatin (LIPITOR) 20 MG tablet Take 1 tablet by mouth Daily.       azithromycin (Zithromax Z-Graeme) 250 MG tablet Take 1 tablet daily for 4 days. Began tomorrow. 4 tablet 0     cetirizine (zyrTEC) 10 MG tablet Take 1 tablet by mouth Daily.       cyclobenzaprine (FLEXERIL)  "10 MG tablet Take 1 tablet by mouth 3 (Three) Times a Day As Needed for Muscle Spasms. 30 tablet 0     fexofenadine (ALLEGRA) 180 MG tablet Take 1 tablet by mouth Daily.       Fluticasone-Umeclidin-Vilant (TRELEGY ELLIPTA) 100-62.5-25 MCG/INH aerosol powder  Inhale 1 each Daily.       folic acid (FOLVITE) 1 MG tablet Take 1 tablet by mouth Daily.       guaiFENesin (MUCINEX) 600 MG 12 hr tablet Take 2 tablets by mouth 2 (Two) Times a Day.       levothyroxine (SYNTHROID, LEVOTHROID) 125 MCG tablet Take 1 tablet by mouth Every Morning.       levothyroxine (SYNTHROID, LEVOTHROID) 75 MCG tablet TAKE 1 TABLET BY MOUTH EVERY DAY ON EMPTY STOMACH IN THE MORNING  1     montelukast (SINGULAIR) 10 MG tablet Take 1 tablet by mouth Every Night.       Multiple Vitamins-Minerals (MULTIVITAMIN WITH MINERALS) tablet tablet Take 1 tablet by mouth Daily.        Allergies:  Patient has no known allergies.    REVIEW OF SYSTEMS:  Please see the above history of present illness for pertinent positives and negatives.  The remainder of the patient's systems have been reviewed and are negative.    Vital Signs  Temp:  [98.1 °F (36.7 °C)-98.5 °F (36.9 °C)] 98.1 °F (36.7 °C)  Heart Rate:  [109-122] 114  Resp:  [20-24] 20  BP: (108-158)/(74-96) 140/85  Oxygen Therapy  SpO2: 92 %  Pulse Oximetry Type: Intermittent  Device (Oxygen Therapy): nasal cannula  Flow (L/min) (Oxygen Therapy): 2}  Body mass index is 24.89 kg/m².    Flowsheet Rows      Flowsheet Row First Filed Value   Admission Height 162.6 cm (64\") Documented at 03/22/2025 0244   Admission Weight 65.8 kg (145 lb) Documented at 03/22/2025 0244               Physical Exam:  Physical Exam   Constitutional: Patient appears older than stated age in no acute distress.  HEENT:   Head: Normocephalic and atraumatic.   Eyes:  Pupils are equal, round, and EOM are intact. Sclerae are anicteric and noninjected.  Cardiovascular: Regular rate, regular rhythm, S1 normal and S2 normal.  Exam reveals no " "gallop and no friction rub.  No murmur heard.  Radial pulses are 2+ and symmetric.  Pulmonary/Chest: Breath sounds are diminished and \"tight\" throughout all fields.  Respirations are nonlabored.   Abdominal: Soft. Bowel sounds are normal. There is no tenderness.   Musculoskeletal: Normal muscle tone  Extremities: No edema.   Neurological: Patient is alert and oriented to person, place, and time. Cranial nerves II-XII are grossly intact with no focal deficits.    Emotional Behavior:    Judgment and Insight: Normal   Mental Status:  Alertness  Normal   Memory:  Appears intact   Mood and Affect:         Depression  None               Anxiety  None    Debilities:   Physical Weakness  None   Handicaps  None   Disabilities  None   Agitation  None     Results Review:    I reviewed the patient's new clinical results.  Lab Results (most recent)       Procedure Component Value Units Date/Time    High Sensitivity Troponin T 1Hr [[id number]] Collected: 03/22/25 0359    Specimen: Blood Updated: 03/22/25 0420     HS Troponin T <6 ng/L      Comment: Specimen hemolyzed.  Results may be falsely decreased.        Troponin T Numeric Delta --     Comment: Unable to calculate.       Narrative:      High Sensitive Troponin T Reference Range:  <14.0 ng/L- Negative Female for AMI  <22.0 ng/L- Negative Male for AMI  >=14 - Abnormal Female indicating possible myocardial injury.  >=22 - Abnormal Male indicating possible myocardial injury.   Clinicians would have to utilize clinical acumen, EKG, Troponin, and serial changes to determine if it is an Acute Myocardial Infarction or myocardial injury due to an underlying chronic condition.         High Sensitivity Troponin T [[id number]]  (Normal) Collected: 03/22/25 0254    Specimen: Blood Updated: 03/22/25 0326     HS Troponin T <6 ng/L     Narrative:      High Sensitive Troponin T Reference Range:  <14.0 ng/L- Negative Female for AMI  <22.0 ng/L- Negative Male for AMI  >=14 - Abnormal Female " "indicating possible myocardial injury.  >=22 - Abnormal Male indicating possible myocardial injury.   Clinicians would have to utilize clinical acumen, EKG, Troponin, and serial changes to determine if it is an Acute Myocardial Infarction or myocardial injury due to an underlying chronic condition.         Procalcitonin [[id number]]  (Normal) Collected: 03/22/25 0254    Specimen: Blood Updated: 03/22/25 0326     Procalcitonin 0.04 ng/mL     Narrative:      As a Marker for Sepsis (Non-Neonates):    1. <0.5 ng/mL represents a low risk of severe sepsis and/or septic shock.  2. >2 ng/mL represents a high risk of severe sepsis and/or septic shock.    As a Marker for Lower Respiratory Tract Infections that require antibiotic therapy:    PCT on Admission    Antibiotic Therapy       6-12 Hrs later    >0.5                Strongly Recommended  >0.25 - <0.5        Recommended   0.1 - 0.25          Discouraged              Remeasure/reassess PCT  <0.1                Strongly Discouraged     Remeasure/reassess PCT    As 28 day mortality risk marker: \"Change in Procalcitonin Result\" (>80% or <=80%) if Day 0 (or Day 1) and Day 4 values are available. Refer to http://www.[email or web address]-pct-[email or web address].com    Change in PCT <=80%  A decrease of PCT levels below or equal to 80% defines a positive change in PCT test result representing a higher risk for 28-day all-cause mortality of patients diagnosed with severe sepsis for septic shock.    Change in PCT >80%  A decrease of PCT levels of more than 80% defines a negative change in PCT result representing a lower risk for 28-day all-cause mortality of patients diagnosed with severe sepsis or septic shock.       BNP [[id number]]  (Normal) Collected: 03/22/25 0254    Specimen: Blood Updated: 03/22/25 0326     proBNP 69.4 pg/mL     Narrative:      This assay is used as an aid in the diagnosis of individuals suspected of having heart failure. It can be used as an aid in the diagnosis of acute " decompensated heart failure (ADHF) in patients presenting with signs and symptoms of ADHF to the emergency department (ED). In addition, NT-proBNP of <300 pg/mL indicates ADHF is not likely.    Age Range Result Interpretation  NT-proBNP Concentration (pg/mL:      <50             Positive            >450                   Gray                 300-450                    Negative             <300    50-75           Positive            >900                  Gray                300-900                  Negative            <300      >75             Positive            >1800                  Gray                300-1800                  Negative            <300    Comprehensive Metabolic Panel [[id number]]  (Abnormal) Collected: 03/22/25 0254    Specimen: Blood Updated: 03/22/25 0326     Glucose 100 mg/dL      BUN 8 mg/dL      Creatinine 0.58 mg/dL      Sodium 136 mmol/L      Potassium 3.8 mmol/L      Chloride 101 mmol/L      CO2 24.2 mmol/L      Calcium 9.1 mg/dL      Total Protein 7.1 g/dL      Albumin 3.9 g/dL      ALT (SGPT) 16 U/L      AST (SGOT) 21 U/L      Alkaline Phosphatase 68 U/L      Total Bilirubin 0.4 mg/dL      Globulin 3.2 gm/dL      A/G Ratio 1.2 g/dL      BUN/Creatinine Ratio 13.8     Anion Gap 10.8 mmol/L      eGFR 105.0 mL/min/1.73     Narrative:      GFR Categories in Chronic Kidney Disease (CKD)      GFR Category          GFR (mL/min/1.73)    Interpretation  G1                     90 or greater         Normal or high (1)  G2                      60-89                Mild decrease (1)  G3a                   45-59                Mild to moderate decrease  G3b                   30-44                Moderate to severe decrease  G4                    15-29                Severe decrease  G5                    14 or less           Kidney failure          (1)In the absence of evidence of kidney disease, neither GFR category G1 or G2 fulfill the criteria for CKD.    eGFR calculation 2021 CKD-EPI creatinine  equation, which does not include race as a factor    Magnesium [[id number]]  (Normal) Collected: 03/22/25 0254    Specimen: Blood Updated: 03/22/25 0326     Magnesium 1.9 mg/dL     Lactic Acid, Plasma [[id number]]  (Normal) Collected: 03/22/25 0254    Specimen: Blood Updated: 03/22/25 0319     Lactate 1.2 mmol/L     CBC & Differential [[id number]]  (Abnormal) Collected: 03/22/25 0254    Specimen: Blood Updated: 03/22/25 0304    Narrative:      The following orders were created for panel order CBC & Differential.  Procedure                               Abnormality         Status                     ---------                               -----------         ------                     CBC Auto Differential[[id number]]        Abnormal            Final result                 Please view results for these tests on the individual orders.    CBC Auto Differential [[id number]]  (Abnormal) Collected: 03/22/25 0254    Specimen: Blood Updated: 03/22/25 0304     WBC 6.64 10*3/mm3      RBC 5.43 10*6/mm3      Hemoglobin 16.4 g/dL      Hematocrit 49.9 %      MCV 91.9 fL      MCH 30.2 pg      MCHC 32.9 g/dL      RDW 12.8 %      RDW-SD 43.0 fl      MPV 9.0 fL      Platelets 165 10*3/mm3      Neutrophil % 65.1 %      Lymphocyte % 21.1 %      Monocyte % 12.2 %      Eosinophil % 0.8 %      Basophil % 0.5 %      Immature Grans % 0.3 %      Neutrophils, Absolute 4.33 10*3/mm3      Lymphocytes, Absolute 1.40 10*3/mm3      Monocytes, Absolute 0.81 10*3/mm3      Eosinophils, Absolute 0.05 10*3/mm3      Basophils, Absolute 0.03 10*3/mm3      Immature Grans, Absolute 0.02 10*3/mm3      nRBC 0.0 /100 WBC             Imaging Results (Most Recent)       Procedure Component Value Units Date/Time    XR Chest 1 View [[id number]] Collected: 03/22/25 0324     Updated: 03/22/25 0400    Narrative:      XR CHEST 1 VW    Date of Exam: 3/22/2025 3:10 AM EDT    Indication: cough    Comparison: 3/23/2025, 9/11/2023.    Findings:  Mild patchy airspace disease is  present within the lung bases bilaterally superimposed on chronic appearing interstitial changes.. No pleural fluid. No pneumothorax. The pulmonary vasculature appears within normal limits. The cardiac and mediastinal   silhouette appear unremarkable. No acute osseous abnormality identified.      Impression:      Impression:  Mild patchy airspace disease within the lung bases bilaterally superimposed on chronic appearing interstitial changes, likely related to a mild pneumonia.        Electronically Signed: Audrey Crawford MD    3/22/2025 3:25 AM EDT    Workstation ID: [id number]          reviewed    ECG/EMG Results (most recent)       Procedure Component Value Units Date/Time    ECG 12 Lead Dyspnea [[id number]] Collected: 03/22/25 0325     Updated: 03/22/25 0326     QT Interval 333 ms      QTC Interval 457 ms     Narrative:      HEART XCSO=960  bpm  RR Ngnsgpwt=092  ms  VA Rdchjxua=994  ms  P Horizontal Axis=-45  deg  P Front Axis=74  deg  QRSD Interval=89  ms  QT Ycnxazvh=917  ms  SEzD=677  ms  QRS Axis=75  deg  T Wave Axis=57  deg  - OTHERWISE NORMAL ECG -  Sinus tachycardia  Date and Time of Study:2025-03-22 03:25:25          reviewed    Assessment & Plan    Acute exacerbation of COPD with hypoxia due to suspected viral pneumonia: Clinical history suspicious for viral etiology given failure to improve after 2 rounds of antibiotics.  I also suspect what we see on chest x-ray is just clinical lag.  Procalcitonin is negative and she has no white count or thrombocytopenia.  For now, we will continue with nebulized bronchodilators as well as Solu-Medrol.  I am going to trend her procalcitonin with a repeat later this morning.  If it turns positive, will consider adding additional antibiotic therapy, but I suspect the the trend will remain negative given the clinical history.  Hypothyroidism: Will continue patient's current replacement dose.  Dyslipidemia: Will continue statin therapy.  History of polycythemia vera: She was  followed by [facility] previously.  Appears her last clinic visit was in 2021.  Ms. Iqbal reports she has not followed up since then.  Hemoglobin is relatively at her baseline.    I discussed the patient's findings and my recommendations with patient and staff.     Cameron Buitrago MD  03/22/25  05:00 EDT    Electronically signed by Cameron Buitrago MD at 03/22/25 0528       Facility-Administered Medications as of 3/24/2025   Medication Dose Route Frequency Provider Last Rate Last Admin    acetaminophen (TYLENOL) tablet 500 mg  500 mg Oral Q6H PRN Cameron Buitrago MD        albuterol (PROVENTIL) nebulizer solution 0.083% 2.5 mg/3mL  2.5 mg Nebulization Q4H PRN Arben Arias DO        arformoterol (BROVANA) nebulizer solution 15 mcg  15 mcg Nebulization BID - RT Arben Arias DO        aspirin chewable tablet 81 mg  81 mg Oral Daily Cameron Buitrago MD   81 mg at 03/24/25 1050    atorvastatin (LIPITOR) tablet 20 mg  20 mg Oral Daily Cameron Buitrago MD   20 mg at 03/24/25 1050    benzonatate (TESSALON) capsule 200 mg  200 mg Oral TID PRN Cameron Buitrago MD   200 mg at 03/24/25 1111    busPIRone (BUSPAR) tablet 15 mg  15 mg Oral BID PRN Arben Arias DO   15 mg at 03/24/25 1111    cetirizine (zyrTEC) tablet 10 mg  10 mg Oral Daily Cameron Buitrago MD   10 mg at 03/24/25 1051    enoxaparin sodium (LOVENOX) syringe 40 mg  40 mg Subcutaneous Daily Cameron Buitrago MD   40 mg at 03/24/25 1051    fluticasone (FLONASE) 50 MCG/ACT nasal spray 1 spray  1 spray Nasal Daily Cameron Buitrago MD   1 spray at 03/24/25 1052    folic acid (FOLVITE) tablet 1 mg  1 mg Oral Daily Cameron Buitrago MD   1 mg at 03/24/25 1051    guaifenesin-dextromethorphan 600-30 mg (MUCINEX DM) 1 tablet  1 tablet Oral Q12H Cameron Buitrago MD   1 tablet at 03/24/25 1050    [COMPLETED] ipratropium-albuterol (DUO-NEB) nebulizer solution 3 mL  3 mL Nebulization Once Roni Cortez MD   3 mL at 03/22/25 0309     levothyroxine (SYNTHROID, LEVOTHROID) tablet 125 mcg  125 mcg Oral Q AM Arben Arias, DO   125 mcg at 03/24/25 0533    methylPREDNISolone sodium succinate (SOLU-Medrol) injection 40 mg  40 mg Intravenous Q12H Arben Arias DO   40 mg at 03/24/25 1051    [COMPLETED] methylPREDNISolone sodium succinate (SOLU-Medrol) injection 60 mg  60 mg Intravenous Q6H Cameron Buitrago MD   60 mg at 03/23/25 0245    [COMPLETED] methylPREDNISolone sodium succinate (SOLU-Medrol) injection 80 mg  80 mg Intravenous Once Roni Cortez MD   80 mg at 03/22/25 0307    montelukast (SINGULAIR) tablet 10 mg  10 mg Oral Nightly Cameron Buitrago MD   10 mg at 03/23/25 2041    multivitamin with minerals 1 tablet  1 tablet Oral Daily Cameron Buitrago MD   1 tablet at 03/24/25 1050    revefenacin (YUPELRI) nebulizer solution 175 mcg  175 mcg Nebulization Daily - RT Arben Arias DO         Lab Results (last 72 hours)       Procedure Component Value Units Date/Time    Respiratory Panel PCR w/COVID-19(SARS-CoV-2) ANTONIETA/GORDY/DEVENDRA/PAD/COR/JUAN A In-House, NP Swab in UTM/VTM, 2 HR TAT - Swab, Nasopharynx [[id number]]  (Abnormal) Collected: 03/22/25 0546    Specimen: Swab from Nasopharynx Updated: 03/22/25 1255     ADENOVIRUS, PCR Not Detected     Coronavirus 229E Not Detected     Coronavirus HKU1 Not Detected     Coronavirus NL63 Not Detected     Coronavirus OC43 Not Detected     COVID19 Not Detected     Human Metapneumovirus Detected     Human Rhinovirus/Enterovirus Not Detected     Influenza A PCR Not Detected     Influenza B PCR Not Detected     Parainfluenza Virus 1 Not Detected     Parainfluenza Virus 2 Not Detected     Parainfluenza Virus 3 Not Detected     Parainfluenza Virus 4 Not Detected     RSV, PCR Not Detected     Bordetella pertussis pcr Not Detected     Bordetella parapertussis PCR Not Detected     Chlamydophila pneumoniae PCR Not Detected     Mycoplasma pneumo by PCR Not Detected    Narrative:      In the  "setting of a positive respiratory panel with a viral infection PLUS a negative procalcitonin without other underlying concern for bacterial infection, consider observing off antibiotics or discontinuation of antibiotics and continue supportive care. If the respiratory panel is positive for atypical bacterial infection (Bordetella pertussis, Chlamydophila pneumoniae, or Mycoplasma pneumoniae), consider antibiotic de-escalation to target atypical bacterial infection.    Procalcitonin [[id number]]  (Normal) Collected: 03/22/25 0915    Specimen: Blood Updated: 03/22/25 0948     Procalcitonin 0.04 ng/mL     Narrative:      As a Marker for Sepsis (Non-Neonates):    1. <0.5 ng/mL represents a low risk of severe sepsis and/or septic shock.  2. >2 ng/mL represents a high risk of severe sepsis and/or septic shock.    As a Marker for Lower Respiratory Tract Infections that require antibiotic therapy:    PCT on Admission    Antibiotic Therapy       6-12 Hrs later    >0.5                Strongly Recommended  >0.25 - <0.5        Recommended   0.1 - 0.25          Discouraged              Remeasure/reassess PCT  <0.1                Strongly Discouraged     Remeasure/reassess PCT    As 28 day mortality risk marker: \"Change in Procalcitonin Result\" (>80% or <=80%) if Day 0 (or Day 1) and Day 4 values are available. Refer to http://[email or web address]-pct-calculator.com    Change in PCT <=80%  A decrease of PCT levels below or equal to 80% defines a positive change in PCT test result representing a higher risk for 28-day all-cause mortality of patients diagnosed with severe sepsis for septic shock.    Change in PCT >80%  A decrease of PCT levels of more than 80% defines a negative change in PCT result representing a lower risk for 28-day all-cause mortality of patients diagnosed with severe sepsis or septic shock.       TSH [[id number]]  (Normal) Collected: 03/22/25 0254    Specimen: Blood Updated: 03/22/25 0711     TSH 0.656 uIU/mL     High " Sensitivity Troponin T 1Hr [[id number]] Collected: 03/22/25 0359    Specimen: Blood Updated: 03/22/25 0420     HS Troponin T <6 ng/L      Comment: Specimen hemolyzed.  Results may be falsely decreased.        Troponin T Numeric Delta --     Comment: Unable to calculate.       Narrative:      High Sensitive Troponin T Reference Range:  <14.0 ng/L- Negative Female for AMI  <22.0 ng/L- Negative Male for AMI  >=14 - Abnormal Female indicating possible myocardial injury.  >=22 - Abnormal Male indicating possible myocardial injury.   Clinicians would have to utilize clinical acumen, EKG, Troponin, and serial changes to determine if it is an Acute Myocardial Infarction or myocardial injury due to an underlying chronic condition.         High Sensitivity Troponin T [[id number]]  (Normal) Collected: 03/22/25 0254    Specimen: Blood Updated: 03/22/25 0326     HS Troponin T <6 ng/L     Narrative:      High Sensitive Troponin T Reference Range:  <14.0 ng/L- Negative Female for AMI  <22.0 ng/L- Negative Male for AMI  >=14 - Abnormal Female indicating possible myocardial injury.  >=22 - Abnormal Male indicating possible myocardial injury.   Clinicians would have to utilize clinical acumen, EKG, Troponin, and serial changes to determine if it is an Acute Myocardial Infarction or myocardial injury due to an underlying chronic condition.         Procalcitonin [[id number]]  (Normal) Collected: 03/22/25 0254    Specimen: Blood Updated: 03/22/25 0326     Procalcitonin 0.04 ng/mL     Narrative:      As a Marker for Sepsis (Non-Neonates):    1. <0.5 ng/mL represents a low risk of severe sepsis and/or septic shock.  2. >2 ng/mL represents a high risk of severe sepsis and/or septic shock.    As a Marker for Lower Respiratory Tract Infections that require antibiotic therapy:    PCT on Admission    Antibiotic Therapy       6-12 Hrs later    >0.5                Strongly Recommended  >0.25 - <0.5        Recommended   0.1 - 0.25           "Discouraged              Remeasure/reassess PCT  <0.1                Strongly Discouraged     Remeasure/reassess PCT    As 28 day mortality risk marker: \"Change in Procalcitonin Result\" (>80% or <=80%) if Day 0 (or Day 1) and Day 4 values are available. Refer to [email or web address]    Change in PCT <=80%  A decrease of PCT levels below or equal to 80% defines a positive change in PCT test result representing a higher risk for 28-day all-cause mortality of patients diagnosed with severe sepsis for septic shock.    Change in PCT >80%  A decrease of PCT levels of more than 80% defines a negative change in PCT result representing a lower risk for 28-day all-cause mortality of patients diagnosed with severe sepsis or septic shock.       BNP [[id number]]  (Normal) Collected: 03/22/25 0254    Specimen: Blood Updated: 03/22/25 0326     proBNP 69.4 pg/mL     Narrative:      This assay is used as an aid in the diagnosis of individuals suspected of having heart failure. It can be used as an aid in the diagnosis of acute decompensated heart failure (ADHF) in patients presenting with signs and symptoms of ADHF to the emergency department (ED). In addition, NT-proBNP of <300 pg/mL indicates ADHF is not likely.    Age Range Result Interpretation  NT-proBNP Concentration (pg/mL:      <50             Positive            >450                   Gray                 300-450                    Negative             <300    50-75           Positive            >900                  Gray                300-900                  Negative            <300      >75             Positive            >1800                  Gray                300-1800                  Negative            <300    Comprehensive Metabolic Panel [[id number]]  (Abnormal) Collected: 03/22/25 0254    Specimen: Blood Updated: 03/22/25 0326     Glucose 100 mg/dL      BUN 8 mg/dL      Creatinine 0.58 mg/dL      Sodium 136 mmol/L      Potassium 3.8 mmol/L      " Chloride 101 mmol/L      CO2 24.2 mmol/L      Calcium 9.1 mg/dL      Total Protein 7.1 g/dL      Albumin 3.9 g/dL      ALT (SGPT) 16 U/L      AST (SGOT) 21 U/L      Alkaline Phosphatase 68 U/L      Total Bilirubin 0.4 mg/dL      Globulin 3.2 gm/dL      A/G Ratio 1.2 g/dL      BUN/Creatinine Ratio 13.8     Anion Gap 10.8 mmol/L      eGFR 105.0 mL/min/1.73     Narrative:      GFR Categories in Chronic Kidney Disease (CKD)      GFR Category          GFR (mL/min/1.73)    Interpretation  G1                     90 or greater         Normal or high (1)  G2                      60-89                Mild decrease (1)  G3a                   45-59                Mild to moderate decrease  G3b                   30-44                Moderate to severe decrease  G4                    15-29                Severe decrease  G5                    14 or less           Kidney failure          (1)In the absence of evidence of kidney disease, neither GFR category G1 or G2 fulfill the criteria for CKD.    eGFR calculation 2021 CKD-EPI creatinine equation, which does not include race as a factor    Magnesium [[id number]]  (Normal) Collected: 03/22/25 0254    Specimen: Blood Updated: 03/22/25 0326     Magnesium 1.9 mg/dL     Lactic Acid, Plasma [[id number]]  (Normal) Collected: 03/22/25 0254    Specimen: Blood Updated: 03/22/25 0319     Lactate 1.2 mmol/L     CBC & Differential [[id number]]  (Abnormal) Collected: 03/22/25 0254    Specimen: Blood Updated: 03/22/25 0304    Narrative:      The following orders were created for panel order CBC & Differential.  Procedure                               Abnormality         Status                     ---------                               -----------         ------                     CBC Auto Differential[[id number]]        Abnormal            Final result                 Please view results for these tests on the individual orders.    CBC Auto Differential [[id number]]  (Abnormal) Collected: 03/22/25  0254    Specimen: Blood Updated: 03/22/25 0304     WBC 6.64 10*3/mm3      RBC 5.43 10*6/mm3      Hemoglobin 16.4 g/dL      Hematocrit 49.9 %      MCV 91.9 fL      MCH 30.2 pg      MCHC 32.9 g/dL      RDW 12.8 %      RDW-SD 43.0 fl      MPV 9.0 fL      Platelets 165 10*3/mm3      Neutrophil % 65.1 %      Lymphocyte % 21.1 %      Monocyte % 12.2 %      Eosinophil % 0.8 %      Basophil % 0.5 %      Immature Grans % 0.3 %      Neutrophils, Absolute 4.33 10*3/mm3      Lymphocytes, Absolute 1.40 10*3/mm3      Monocytes, Absolute 0.81 10*3/mm3      Eosinophils, Absolute 0.05 10*3/mm3      Basophils, Absolute 0.03 10*3/mm3      Immature Grans, Absolute 0.02 10*3/mm3      nRBC 0.0 /100 WBC           Imaging Results (Last 72 Hours)       Procedure Component Value Units Date/Time    XR Chest 1 View [[id number]] Collected: 03/22/25 0324     Updated: 03/22/25 0400    Narrative:      XR CHEST 1 VW    Date of Exam: 3/22/2025 3:10 AM EDT    Indication: cough    Comparison: 3/23/2025, 9/11/2023.    Findings:  Mild patchy airspace disease is present within the lung bases bilaterally superimposed on chronic appearing interstitial changes.. No pleural fluid. No pneumothorax. The pulmonary vasculature appears within normal limits. The cardiac and mediastinal   silhouette appear unremarkable. No acute osseous abnormality identified.      Impression:      Impression:  Mild patchy airspace disease within the lung bases bilaterally superimposed on chronic appearing interstitial changes, likely related to a mild pneumonia.        Electronically Signed: Audrey Crawford MD    3/22/2025 3:25 AM EDT    Workstation ID: [id number]          ECG/EMG Results (last 72 hours)       Procedure Component Value Units Date/Time    ECG 12 Lead Dyspnea [[id number]] Collected: 03/22/25 0325     Updated: 03/22/25 1655     QT Interval 333 ms      QTC Interval 457 ms     Narrative:      HEART XKCR=194  bpm  RR Hhslrkti=597  ms  MS Zqvxtszg=424  ms  P Horizontal Axis=-45   deg  P Front Axis=74  deg  QRSD Interval=89  ms  QT Kiwxcqih=754  ms  LQzO=189  ms  QRS Axis=75  deg  T Wave Axis=57  deg  - OTHERWISE NORMAL ECG -  Sinus tachycardia  No change from prior tracing  Electronically Signed By: Sharon Lundberg ([facility]) 2025-03-22 16:54:58  Date and Time of Study:2025-03-22 03:25:25          Orders (last 72 hrs)        Start     Ordered    03/24/25 1245  arformoterol (BROVANA) nebulizer solution 15 mcg  2 Times Daily - RT         03/24/25 1156    03/24/25 1245  revefenacin (YUPELRI) nebulizer solution 175 mcg  Daily - RT         03/24/25 1156    03/24/25 1156  albuterol (PROVENTIL) nebulizer solution 0.083% 2.5 mg/3mL  Every 4 Hours PRN         03/24/25 1156    03/24/25 0600  levothyroxine (SYNTHROID, LEVOTHROID) tablet 125 mcg  Every Early Morning         03/23/25 1431    03/23/25 1431  busPIRone (BUSPAR) tablet 15 mg  2 Times Daily PRN         03/23/25 1431    03/23/25 1421  cyclobenzaprine (FLEXERIL) tablet 10 mg  3 Times Daily PRN,   Status:  Discontinued         03/23/25 1422    03/23/25 1407  Inpatient Admission  Once         03/23/25 1406    03/23/25 1045  budesonide (PULMICORT) nebulizer solution 0.5 mg  2 Times Daily - RT,   Status:  Discontinued         03/23/25 0949    03/23/25 1045  methylPREDNISolone sodium succinate (SOLU-Medrol) injection 40 mg  Every 12 Hours         03/23/25 0949    03/22/25 2100  montelukast (SINGULAIR) tablet 10 mg  Nightly         03/22/25 0633    03/22/25 0900  enoxaparin sodium (LOVENOX) syringe 40 mg  Daily         03/22/25 0457    03/22/25 0900  Procalcitonin  Timed         03/22/25 0502    03/22/25 0900  methylPREDNISolone sodium succinate (SOLU-Medrol) injection 60 mg  Every 6 Hours         03/22/25 0502    03/22/25 0900  aspirin chewable tablet 81 mg  Daily         03/22/25 0633    03/22/25 0900  atorvastatin (LIPITOR) tablet 20 mg  Daily         03/22/25 0633    03/22/25 0900  cetirizine (zyrTEC) tablet 10 mg  Daily         03/22/25 0633     03/22/25 0900  fluticasone (FLONASE) 50 MCG/ACT nasal spray 1 spray  Daily         03/22/25 0633    03/22/25 0900  folic acid (FOLVITE) tablet 1 mg  Daily         03/22/25 0633    03/22/25 0900  multivitamin with minerals 1 tablet  Daily         03/22/25 0633    03/22/25 0730  levothyroxine (SYNTHROID, LEVOTHROID) tablet 200 mcg  Every Early Morning,   Status:  Discontinued         03/22/25 0633    03/22/25 0730  guaifenesin-dextromethorphan 600-30 mg (MUCINEX DM) 1 tablet  Every 12 Hours         03/22/25 0633    03/22/25 0700  ipratropium-albuterol (DUO-NEB) nebulizer solution 3 mL  Every 6 Hours While Awake - RT,   Status:  Discontinued         03/22/25 0502    03/22/25 0632  benzonatate (TESSALON) capsule 200 mg  3 Times Daily PRN         03/22/25 0633    03/22/25 0631  acetaminophen (TYLENOL) tablet 500 mg  Every 6 Hours PRN         03/22/25 0633    03/22/25 0533  TSH  Once         03/22/25 0532    03/22/25 0523  Inpatient Case Management  Consult  Once        Provider:  (Not yet assigned)    03/22/25 0523    03/22/25 0515  Continuous Pulse Oximetry  Continuous         03/22/25 0514    03/22/25 0502  ipratropium-albuterol (DUO-NEB) nebulizer solution 3 mL  Every 2 Hours PRN,   Status:  Discontinued         03/22/25 0502    03/22/25 0457  Respiratory Panel PCR w/COVID-19(SARS-CoV-2) ANTONIETA/GORDY/DEVENDRA/PAD/COR/JUAN A In-House, NP Swab in UTM/VTM, 2 HR TAT - Swab, Nasopharynx  Once         03/22/25 0456    03/22/25 0457  Code Status and Medical Interventions: CPR (Attempt to Resuscitate); Full Support  Continuous         03/22/25 0457    03/22/25 0456  Vital Signs  Per Hospital Policy/Protocol         03/22/25 0456    03/22/25 0456  Activity As Tolerated  Until Discontinued         03/22/25 0456    03/22/25 0456  Diet: Regular/House; Fluid Consistency: Thin (IDDSI 0)  Diet Effective Now         03/22/25 0456    03/22/25 0404  Initiate Observation Status  Once         03/22/25 0403    03/22/25 0404  Cardiac  Monitoring  Continuous,   Status:  Canceled        Comments: Follow Standing Orders As Outlined in Process Instructions (Open Order Report to View Full Instructions)    03/22/25 0403    03/22/25 0354  High Sensitivity Troponin T 1Hr  PROCEDURE ONCE         03/22/25 0326    03/22/25 0308  ipratropium-albuterol (DUO-NEB) nebulizer solution 3 mL  Once         03/22/25 0252    03/22/25 0308  methylPREDNISolone sodium succinate (SOLU-Medrol) injection 80 mg  Once         03/22/25 0252    03/22/25 0253  Magnesium  Once         03/22/25 0252    03/22/25 0252  ECG 12 Lead Dyspnea  Once         03/22/25 0251    03/22/25 0252  High Sensitivity Troponin T  Once         03/22/25 0251    03/22/25 0252  BNP  Once         03/22/25 0251    03/22/25 0251  XR Chest 1 View  1 Time Imaging         03/22/25 0251    03/22/25 0250  Lactic Acid, Plasma  STAT         03/22/25 0251    03/22/25 0250  Blood Culture - Blood,  STAT,   Status:  Canceled         03/22/25 0251    03/22/25 0250  Blood Culture - Blood,  STAT,   Status:  Canceled         03/22/25 0251    03/22/25 0250  Procalcitonin  STAT         03/22/25 0251    03/22/25 0250  Comprehensive Metabolic Panel  STAT         03/22/25 0251    03/22/25 0250  CBC & Differential  STAT         03/22/25 0251    03/22/25 0250  CBC Auto Differential  PROCEDURE ONCE         03/22/25 0251    03/12/25 0000  benzonatate (TESSALON) 100 MG capsule  3 Times Daily PRN         03/22/25 0502    03/12/25 0000  ipratropium-albuterol (DUO-NEB) 0.5-2.5 mg/3 ml nebulizer  3 Times Daily PRN         03/22/25 0502    07/21/19 0000  fluticasone (FLONASE) 50 MCG/ACT nasal spray  Daily         03/22/25 0502    Unscheduled  Oxygen Therapy- Nasal Cannula; Titrate 1-6 LPM Per SpO2; 90 - 95%  Continuous PRN       03/22/25 0456    --  multivitamin (THERAGRAN) tablet tablet  Daily         03/22/25 0502    --  dextromethorphan-guaifenesin (MUCINEX DM)  MG per 12 hr tablet  Every 12 Hours         03/22/25 0502    --   Calcium Carb-Cholecalciferol 600-5 MG-MCG tablet  2 Times Daily         25 0502    --  vitamin D (ERGOCALCIFEROL) 1.25 MG (07705 UT) capsule capsule  Every 14 Days         25 0502    --  fluconazole (DIFLUCAN) 150 MG tablet  Daily         25 0502    --  acetaminophen (TYLENOL) 500 MG tablet  Every 6 Hours PRN         25 0502                  Operative/Procedure Notes (last 72 hours)  Notes from 25 1251 through 25 1251   No notes of this type exist for this encounter.          Physician Progress Notes (last 72 hours)        Arben Arias DO at 25 1157          Patient Care Team:  Yodit Reyes APRN as PCP - General (Family Medicine)    Date: 3/24/2025  Patient Name: Irais Bolivar  : 1965  MRN: [id number]  Date of admission: 3/22/2025  Room/Bed: [facility]      Subjective   Subjective         Chief Complaint: f/u sob     Summary:Irais Bolivar is a 59 y.o. female who presents this evening with a 1 to 2-day history of worsening dyspnea.  She reports she has had a rough year thus far and that she had an upper respiratory tract infection in January, the flu in February, and that she was seen in our ER a week or 2 ago and diagnosed with pneumonia.  She also reports that her granddaughter tested positive for RSV.  When she was seen in the ER, she was started on a course of doxycycline which she completed.  She followed up with her PCP who gave her steroid shot and apparently had another course of an antibiotic.  She reports feeling a little bit better until her shortness of breath just was not responding to as needed breathing treatments.  She denies fever and chills.  She denies chest pain.  She denies nausea, vomiting, diarrhea.  She reports a good appetite and p.o. intake.  She does not smoke but is exposed to secondhand smoke.  She works at [employer].  She was at work this evening when she checked her sat and apparently was 66%.  EMS was summoned and  "she was brought to the ER on 2 L with a noted sat of 88%.     Interval Followup:   Patient is sitting up in bed, still feels short of breath. She had a reaction to pulmicort yesterday.     Review of Systems   Respiratory:  Positive for cough and shortness of breath.          Objective   Objective       Vital Signs  Temp:  [97.6 °F (36.4 °C)-98.4 °F (36.9 °C)] 97.6 °F (36.4 °C)  Heart Rate:  [] 101  Resp:  [18-20] 18  BP: (105-138)/(72-81) 136/72  Oxygen Therapy  SpO2: 92 %  Pulse Oximetry Type: Continuous  Device (Oxygen Therapy): humidified, nasal cannula  Flow (L/min) (Oxygen Therapy): 3  Flowsheet Rows      Flowsheet Row First Filed Value   Admission Height 162.6 cm (64\") Documented at 03/22/2025 0244   Admission Weight 65.8 kg (145 lb) Documented at 03/22/2025 0244          Intake & Output (last 3 days)         03/20 0701  03/21 0700 03/21 0701  03/22 0700 03/22 0701  03/23 0700 03/23 0701  03/24 0700    P.O.   220 240    Total Intake(mL/kg)   220 (3.3) 240 (3.6)    Urine (mL/kg/hr)   1700 (1.1) 350 (1.1)    Stool   0     Total Output   1700 350    Net   -1480 -110            Urine Unmeasured Occurrence  1 x      Stool Unmeasured Occurrence   1 x           Lines, Drains & Airways       Active LDAs       Name Placement date Placement time Site Days    Peripheral IV 03/22/25 0254 Left Antecubital 03/22/25  0254  Antecubital  1                      Physical Exam  Vitals reviewed.   Constitutional:       General: She is not in acute distress.     Appearance: She is normal weight.   HENT:      Head: Normocephalic and atraumatic.      Mouth/Throat:      Mouth: Mucous membranes are moist.   Cardiovascular:      Rate and Rhythm: Normal rate and regular rhythm.   Pulmonary:      Comments: Diminished aeration throughout, mild expiratory wheezing, on 3L NC  Abdominal:      General: Bowel sounds are normal. There is no distension.      Palpations: Abdomen is soft.      Tenderness: There is no abdominal tenderness. " "  Musculoskeletal:      Right lower leg: No edema.      Left lower leg: No edema.   Skin:     General: Skin is warm and dry.   Neurological:      Mental Status: She is alert. Mental status is at baseline.   Psychiatric:         Mood and Affect: Mood normal.         Behavior: Behavior normal.           Results Review:      Results from last 7 days   Lab Units 03/22/25  0254   WBC 10*3/mm3 6.64   HEMOGLOBIN g/dL 16.4*   HEMATOCRIT % 49.9*   PLATELETS 10*3/mm3 165     Results from last 7 days   Lab Units 03/22/25  0254   SODIUM mmol/L 136   POTASSIUM mmol/L 3.8   CHLORIDE mmol/L 101   CO2 mmol/L 24.2   BUN mg/dL 8   CREATININE mg/dL 0.58   CALCIUM mg/dL 9.1   BILIRUBIN mg/dL 0.4   ALK PHOS U/L 68   ALT (SGPT) U/L 16   AST (SGOT) U/L 21   GLUCOSE mg/dL 100*       Results from last 7 days   Lab Units 03/22/25  0254   MAGNESIUM mg/dL 1.9       No results found for: \"BLOODCX\"    No results found for: \"MRSACX\"    No results found for: \"STOOLCX\"    No results found for: \"URINECX\"    No results found for: \"WOUNDCX\"    Imaging Results (Last 24 Hours)       ** No results found for the last 24 hours. **            I have personally reviewed the patient's new imaging and lab results.          ECG/EMG Results (most recent)       Procedure Component Value Units Date/Time    ECG 12 Lead Dyspnea [[id number]] Collected: 03/22/25 0325     Updated: 03/22/25 1655     QT Interval 333 ms      QTC Interval 457 ms     Narrative:      HEART KYDA=146  bpm  RR Vlkvopby=815  ms  AZ Qafdfxqi=823  ms  P Horizontal Axis=-45  deg  P Front Axis=74  deg  QRSD Interval=89  ms  QT Chwpjqyt=971  ms  GVmL=972  ms  QRS Axis=75  deg  T Wave Axis=57  deg  - OTHERWISE NORMAL ECG -  Sinus tachycardia  No change from prior tracing  Electronically Signed By: Sharon Lundberg ([facility]) 2025-03-22 16:54:58  Date and Time of Study:2025-03-22 03:25:25                    Medication Review:     Scheduled Meds:arformoterol, 15 mcg, Nebulization, BID - RT  aspirin, 81 mg, Oral, " Daily  atorvastatin, 20 mg, Oral, Daily  cetirizine, 10 mg, Oral, Daily  enoxaparin sodium, 40 mg, Subcutaneous, Daily  fluticasone, 1 spray, Nasal, Daily  folic acid, 1 mg, Oral, Daily  guaifenesin-dextromethorphan 600-30 mg, 1 tablet, Oral, Q12H  levothyroxine, 125 mcg, Oral, Q AM  methylPREDNISolone sodium succinate, 40 mg, Intravenous, Q12H  montelukast, 10 mg, Oral, Nightly  multivitamin with minerals, 1 tablet, Oral, Daily  revefenacin, 175 mcg, Nebulization, Daily - RT      Continuous Infusions:   PRN Meds:.  acetaminophen    albuterol    benzonatate    busPIRone      I have reviewed the patient's current medication list    Assessment & Plan   Assessment / Plan       Active Hospital Problems    Diagnosis  POA    **COPD (chronic obstructive pulmonary disease) [J44.9]  Yes       Acute exacerbation of COPD with hypoxia d/t viral pneumonia caused by human metapneumovirus   - Procalcitonin is negative and she has no white count or thrombocytopenia  - continue mucinex, singulair, and flonase  - continue solumedrol 40mg IV bid   - dc pulmicort and added to allergy list  - dc duonebs, start brovana and yupelri, albuterol prn   - continue to wean supplemental oxygen as tolerated   - needs nebulizer at discharge     Hypothyroidism  - Will continue patient's current replacement dose, patient is only taking 125mcg daily, incorrectly getting 200mcg daily     Dyslipidemia  - continue statin     History of polycythemia vera  - She was followed by [facility] previously.  Appears her last clinic visit was in 2021.  Ms. Iqbal reports she has not followed up since then.   - Hemoglobin is relatively at her baseline.       DVT Prophylaxis  - enoxaparin     Code Status  - Full Code    Plan for disposition: home once respiratory status improved, hopefully in the next 24-48 hours     Arben Arias DO  03/24/25  11:57 EDT          Note disclaimer: At [facility], we believe that sharing information builds trust and  better relationships. You are receiving this note because you recently visited [facility]. It is possible you will see health information before a provider has talked with you about it. This kind of information can be easy to misunderstand. To help you fully understand what it means for your health, we urge you to discuss this note with your provider.        Electronically signed by Arben Arias DO at 25 1201       Arben Arias DO at 25 1152          Patient Care Team:  Yodit Reyes APRN as PCP - General (Family Medicine)    Date: 3/23/2025  Patient Name: Irais Bolivar  : 1965  MRN: [id number]  Date of admission: 3/22/2025  Room/Bed: [facility]      Subjective   Subjective         Chief Complaint: f/u sob     Summary:Irais Bolivar is a 59 y.o. female who presents this evening with a 1 to 2-day history of worsening dyspnea.  She reports she has had a rough year thus far and that she had an upper respiratory tract infection in January, the flu in February, and that she was seen in our ER a week or 2 ago and diagnosed with pneumonia.  She also reports that her granddaughter tested positive for RSV.  When she was seen in the ER, she was started on a course of doxycycline which she completed.  She followed up with her PCP who gave her steroid shot and apparently had another course of an antibiotic.  She reports feeling a little bit better until her shortness of breath just was not responding to as needed breathing treatments.  She denies fever and chills.  She denies chest pain.  She denies nausea, vomiting, diarrhea.  She reports a good appetite and p.o. intake.  She does not smoke but is exposed to secondhand smoke.  She works at [employer].  She was at work this evening when she checked her sat and apparently was 66%.  EMS was summoned and she was brought to the ER on 2 L with a noted sat of 88%.     Interval Followup:   Patient is sitting up in bed, states she  "feels about the same. Nursing reports they tried to wean down oxygen but sats dropped and had to be increased .    Review of Systems   Respiratory:  Positive for cough and shortness of breath.          Objective   Objective       Vital Signs  Temp:  [97.5 °F (36.4 °C)-98.5 °F (36.9 °C)] 97.5 °F (36.4 °C)  Heart Rate:  [] 105  Resp:  [18-22] 20  BP: (109-119)/(62-79) 119/64  Oxygen Therapy  SpO2: 92 %  Pulse Oximetry Type: Continuous  Device (Oxygen Therapy): nasal cannula  Flow (L/min) (Oxygen Therapy): 1.5  Flowsheet Rows      Flowsheet Row First Filed Value   Admission Height 162.6 cm (64\") Documented at 03/22/2025 0244   Admission Weight 65.8 kg (145 lb) Documented at 03/22/2025 0244          Intake & Output (last 3 days)         03/20 0701  03/21 0700 03/21 0701  03/22 0700 03/22 0701  03/23 0700 03/23 0701  03/24 0700    P.O.   220 240    Total Intake(mL/kg)   220 (3.3) 240 (3.6)    Urine (mL/kg/hr)   1700 (1.1) 350 (1.1)    Stool   0     Total Output   1700 350    Net   -1480 -110            Urine Unmeasured Occurrence  1 x      Stool Unmeasured Occurrence   1 x           Lines, Drains & Airways       Active LDAs       Name Placement date Placement time Site Days    Peripheral IV 03/22/25 0254 Left Antecubital 03/22/25  0254  Antecubital  1                      Physical Exam  Vitals reviewed.   Constitutional:       General: She is not in acute distress.     Appearance: She is normal weight.   HENT:      Head: Normocephalic and atraumatic.   Cardiovascular:      Rate and Rhythm: Normal rate and regular rhythm.   Pulmonary:      Comments: Diminished aeration throughout, mild expiratory wheezing, on 2L NC  Musculoskeletal:      Right lower leg: No edema.      Left lower leg: No edema.   Skin:     General: Skin is warm and dry.   Neurological:      Mental Status: She is alert. Mental status is at baseline.   Psychiatric:         Mood and Affect: Mood normal.         Behavior: Behavior normal. " "          Results Review:      Results from last 7 days   Lab Units 03/22/25  0254   WBC 10*3/mm3 6.64   HEMOGLOBIN g/dL 16.4*   HEMATOCRIT % 49.9*   PLATELETS 10*3/mm3 165     Results from last 7 days   Lab Units 03/22/25  0254   SODIUM mmol/L 136   POTASSIUM mmol/L 3.8   CHLORIDE mmol/L 101   CO2 mmol/L 24.2   BUN mg/dL 8   CREATININE mg/dL 0.58   CALCIUM mg/dL 9.1   BILIRUBIN mg/dL 0.4   ALK PHOS U/L 68   ALT (SGPT) U/L 16   AST (SGOT) U/L 21   GLUCOSE mg/dL 100*       Results from last 7 days   Lab Units 03/22/25  0254   MAGNESIUM mg/dL 1.9       No results found for: \"BLOODCX\"    No results found for: \"MRSACX\"    No results found for: \"STOOLCX\"    No results found for: \"URINECX\"    No results found for: \"WOUNDCX\"    Imaging Results (Last 24 Hours)       ** No results found for the last 24 hours. **            I have personally reviewed the patient's new imaging and lab results.          ECG/EMG Results (most recent)       Procedure Component Value Units Date/Time    ECG 12 Lead Dyspnea [[id number]] Collected: 03/22/25 0325     Updated: 03/22/25 1655     QT Interval 333 ms      QTC Interval 457 ms     Narrative:      HEART LOLR=537  bpm  RR Grrzdwmp=429  ms  IN Wkhrnobd=922  ms  P Horizontal Axis=-45  deg  P Front Axis=74  deg  QRSD Interval=89  ms  QT Dlhxiass=987  ms  FIlN=999  ms  QRS Axis=75  deg  T Wave Axis=57  deg  - OTHERWISE NORMAL ECG -  Sinus tachycardia  No change from prior tracing  Electronically Signed By: Sharon Lundberg ([facility]) 2025-03-22 16:54:58  Date and Time of Study:2025-03-22 03:25:25                    Medication Review:     Scheduled Meds:aspirin, 81 mg, Oral, Daily  atorvastatin, 20 mg, Oral, Daily  budesonide, 0.5 mg, Nebulization, BID - RT  cetirizine, 10 mg, Oral, Daily  enoxaparin sodium, 40 mg, Subcutaneous, Daily  fluticasone, 1 spray, Nasal, Daily  folic acid, 1 mg, Oral, Daily  guaifenesin-dextromethorphan 600-30 mg, 1 tablet, Oral, Q12H  ipratropium-albuterol, 3 mL, Nebulization, " Q6H While Awake - RT  levothyroxine, 200 mcg, Oral, Q AM  methylPREDNISolone sodium succinate, 40 mg, Intravenous, Q12H  montelukast, 10 mg, Oral, Nightly  multivitamin with minerals, 1 tablet, Oral, Daily      Continuous Infusions:   PRN Meds:.  acetaminophen    benzonatate    ipratropium-albuterol      I have reviewed the patient's current medication list    Assessment & Plan   Assessment / Plan       Active Hospital Problems    Diagnosis  POA    **COPD (chronic obstructive pulmonary disease) [J44.9]  Yes       Acute exacerbation of COPD with hypoxia d/t viral pneumonia caused by human metapneumovirus   - Procalcitonin is negative and she has no white count or thrombocytopenia  - continue duonebs scheduled and prn  - continue mucinex, singulair, and flonase  - add pulmicort bid  - patient reports she was recently given nebulizer treatments but she does not have a nebulizer so will need to get this prior to discharge  - wean steroids to solumedrol 40mg IV bid   - continue to wean supplemental oxygen as tolerated     Hypothyroidism  - Will continue patient's current replacement dose.    Dyslipidemia  - continue statin     History of polycythemia vera  - She was followed by [facility] previously.  Appears her last clinic visit was in 2021.  Ms. Iqbal reports she has not followed up since then.   - Hemoglobin is relatively at her baseline.       DVT Prophylaxis  - enoxaparin     Code Status  - Full Code    Plan for disposition: home once respiratory status improved, hopefully in the next 24-48 hours     Arben Arias DO  03/23/25  11:58 EDT          Note disclaimer: At [facility], we believe that sharing information builds trust and better relationships. You are receiving this note because you recently visited [facility]. It is possible you will see health information before a provider has talked with you about it. This kind of information can be easy to misunderstand. To help you fully understand  what it means for your health, we urge you to discuss this note with your provider.        Electronically signed by Arben Arias DO at 03/23/25 1213       Consult Notes (last 72 hours)  Notes from 03/21/25 1251 through 03/24/25 1251   No notes of this type exist for this encounter.

## 2025-03-24 NOTE — PLAN OF CARE
"Goal Outcome Evaluation:  Plan of Care Reviewed With: patient        Progress: no change  Outcome Evaluation: AOx4, pleasant. On 3 LPM NC. Occasional cough, Tessalon pearls PRN given. On Solumedrol IV.  Ambulate to bathroom on her own, SOB reported but reports \"better than before\". No other complaint as of the moment.                             "

## 2025-03-24 NOTE — PROGRESS NOTES
Patient Care Team:  Yodit Reyes APRN as PCP - General (Family Medicine)    Date: 3/24/2025  Patient Name: Irais Bolivar  : 1965  MRN: [id number]  Date of admission: 3/22/2025  Room/Bed: [facility]      Subjective   Subjective         Chief Complaint: f/u sob     Summary:Irais Bolivar is a 59 y.o. female who presents this evening with a 1 to 2-day history of worsening dyspnea.  She reports she has had a rough year thus far and that she had an upper respiratory tract infection in January, the flu in February, and that she was seen in our ER a week or 2 ago and diagnosed with pneumonia.  She also reports that her granddaughter tested positive for RSV.  When she was seen in the ER, she was started on a course of doxycycline which she completed.  She followed up with her PCP who gave her steroid shot and apparently had another course of an antibiotic.  She reports feeling a little bit better until her shortness of breath just was not responding to as needed breathing treatments.  She denies fever and chills.  She denies chest pain.  She denies nausea, vomiting, diarrhea.  She reports a good appetite and p.o. intake.  She does not smoke but is exposed to secondhand smoke.  She works at [employer].  She was at work this evening when she checked her sat and apparently was 66%.  EMS was summoned and she was brought to the ER on 2 L with a noted sat of 88%.     Interval Followup:   Patient is sitting up in bed, still feels short of breath. She had a reaction to pulmicort yesterday.     Review of Systems   Respiratory:  Positive for cough and shortness of breath.          Objective   Objective       Vital Signs  Temp:  [97.6 °F (36.4 °C)-98.4 °F (36.9 °C)] 97.6 °F (36.4 °C)  Heart Rate:  [] 101  Resp:  [18-20] 18  BP: (105-138)/(72-81) 136/72  Oxygen Therapy  SpO2: 92 %  Pulse Oximetry Type: Continuous  Device (Oxygen Therapy): humidified, nasal cannula  Flow (L/min) (Oxygen Therapy): 3  Flowsheet Rows   "    Flowsheet Row First Filed Value   Admission Height 162.6 cm (64\") Documented at 03/22/2025 0244   Admission Weight 65.8 kg (145 lb) Documented at 03/22/2025 0244          Intake & Output (last 3 days)         03/20 0701  03/21 0700 03/21 0701  03/22 0700 03/22 0701  03/23 0700 03/23 0701  03/24 0700    P.O.   220 240    Total Intake(mL/kg)   220 (3.3) 240 (3.6)    Urine (mL/kg/hr)   1700 (1.1) 350 (1.1)    Stool   0     Total Output   1700 350    Net   -1480 -110            Urine Unmeasured Occurrence  1 x      Stool Unmeasured Occurrence   1 x           Lines, Drains & Airways       Active LDAs       Name Placement date Placement time Site Days    Peripheral IV 03/22/25 0254 Left Antecubital 03/22/25  0254  Antecubital  1                      Physical Exam  Vitals reviewed.   Constitutional:       General: She is not in acute distress.     Appearance: She is normal weight.   HENT:      Head: Normocephalic and atraumatic.      Mouth/Throat:      Mouth: Mucous membranes are moist.   Cardiovascular:      Rate and Rhythm: Normal rate and regular rhythm.   Pulmonary:      Comments: Diminished aeration throughout, mild expiratory wheezing, on 3L NC  Abdominal:      General: Bowel sounds are normal. There is no distension.      Palpations: Abdomen is soft.      Tenderness: There is no abdominal tenderness.   Musculoskeletal:      Right lower leg: No edema.      Left lower leg: No edema.   Skin:     General: Skin is warm and dry.   Neurological:      Mental Status: She is alert. Mental status is at baseline.   Psychiatric:         Mood and Affect: Mood normal.         Behavior: Behavior normal.           Results Review:      Results from last 7 days   Lab Units 03/22/25  0254   WBC 10*3/mm3 6.64   HEMOGLOBIN g/dL 16.4*   HEMATOCRIT % 49.9*   PLATELETS 10*3/mm3 165     Results from last 7 days   Lab Units 03/22/25  0254   SODIUM mmol/L 136   POTASSIUM mmol/L 3.8   CHLORIDE mmol/L 101   CO2 mmol/L 24.2   BUN mg/dL 8 " "  CREATININE mg/dL 0.58   CALCIUM mg/dL 9.1   BILIRUBIN mg/dL 0.4   ALK PHOS U/L 68   ALT (SGPT) U/L 16   AST (SGOT) U/L 21   GLUCOSE mg/dL 100*       Results from last 7 days   Lab Units 03/22/25  0254   MAGNESIUM mg/dL 1.9       No results found for: \"BLOODCX\"    No results found for: \"MRSACX\"    No results found for: \"STOOLCX\"    No results found for: \"URINECX\"    No results found for: \"WOUNDCX\"    Imaging Results (Last 24 Hours)       ** No results found for the last 24 hours. **            I have personally reviewed the patient's new imaging and lab results.          ECG/EMG Results (most recent)       Procedure Component Value Units Date/Time    ECG 12 Lead Dyspnea [[id number]] Collected: 03/22/25 0325     Updated: 03/22/25 1655     QT Interval 333 ms      QTC Interval 457 ms     Narrative:      HEART BLJN=471  bpm  RR Nxjgvdhi=494  ms  WV Jmiteqpt=789  ms  P Horizontal Axis=-45  deg  P Front Axis=74  deg  QRSD Interval=89  ms  QT Legdaohf=126  ms  GHeE=938  ms  QRS Axis=75  deg  T Wave Axis=57  deg  - OTHERWISE NORMAL ECG -  Sinus tachycardia  No change from prior tracing  Electronically Signed By: Sharon Lundberg ([facility]) 2025-03-22 16:54:58  Date and Time of Study:2025-03-22 03:25:25                    Medication Review:     Scheduled Meds:arformoterol, 15 mcg, Nebulization, BID - RT  aspirin, 81 mg, Oral, Daily  atorvastatin, 20 mg, Oral, Daily  cetirizine, 10 mg, Oral, Daily  enoxaparin sodium, 40 mg, Subcutaneous, Daily  fluticasone, 1 spray, Nasal, Daily  folic acid, 1 mg, Oral, Daily  guaifenesin-dextromethorphan 600-30 mg, 1 tablet, Oral, Q12H  levothyroxine, 125 mcg, Oral, Q AM  methylPREDNISolone sodium succinate, 40 mg, Intravenous, Q12H  montelukast, 10 mg, Oral, Nightly  multivitamin with minerals, 1 tablet, Oral, Daily  revefenacin, 175 mcg, Nebulization, Daily - RT      Continuous Infusions:   PRN Meds:.  acetaminophen    albuterol    benzonatate    busPIRone      I have reviewed the patient's " current medication list    Assessment & Plan   Assessment / Plan       Active Hospital Problems    Diagnosis  POA    **COPD (chronic obstructive pulmonary disease) [J44.9]  Yes       Acute exacerbation of COPD with hypoxia d/t viral pneumonia caused by human metapneumovirus   - Procalcitonin is negative and she has no white count or thrombocytopenia  - continue mucinex, singulair, and flonase  - continue solumedrol 40mg IV bid   - dc pulmicort and added to allergy list  - dc duonebs, start brovana and yupelri, albuterol prn   - continue to wean supplemental oxygen as tolerated   - needs nebulizer at discharge     Hypothyroidism  - Will continue patient's current replacement dose, patient is only taking 125mcg daily, incorrectly getting 200mcg daily     Dyslipidemia  - continue statin     History of polycythemia vera  - She was followed by [place] [place] [facility] previously.  Appears her last clinic visit was in 2021.  Ms. Iqbal reports she has not followed up since then.   - Hemoglobin is relatively at her baseline.       DVT Prophylaxis  - enoxaparin     Code Status  - Full Code    Plan for disposition: home once respiratory status improved, hopefully in the next 24-48 hours     Arben Arias DO  03/24/25  11:57 EDT          Note disclaimer: At [facility], we believe that sharing information builds trust and better relationships. You are receiving this note because you recently visited [facility]. It is possible you will see health information before a provider has talked with you about it. This kind of information can be easy to misunderstand. To help you fully understand what it means for your health, we urge you to discuss this note with your provider.

## 2025-03-25 ENCOUNTER — READMISSION MANAGEMENT (OUTPATIENT)
Dept: CALL CENTER | Facility: HOSPITAL | Age: 60
End: 2025-03-25
Payer: COMMERCIAL

## 2025-03-25 VITALS
RESPIRATION RATE: 16 BRPM | DIASTOLIC BLOOD PRESSURE: 68 MMHG | HEIGHT: 64 IN | WEIGHT: 145 LBS | TEMPERATURE: 97.4 F | BODY MASS INDEX: 24.75 KG/M2 | SYSTOLIC BLOOD PRESSURE: 123 MMHG | HEART RATE: 78 BPM | OXYGEN SATURATION: 90 %

## 2025-03-25 PROCEDURE — 25010000002 METHYLPREDNISOLONE PER 40 MG: Performed by: INTERNAL MEDICINE

## 2025-03-25 PROCEDURE — 99239 HOSP IP/OBS DSCHRG MGMT >30: CPT | Performed by: INTERNAL MEDICINE

## 2025-03-25 PROCEDURE — 94761 N-INVAS EAR/PLS OXIMETRY MLT: CPT

## 2025-03-25 PROCEDURE — 25010000002 ENOXAPARIN PER 10 MG: Performed by: HOSPITALIST

## 2025-03-25 PROCEDURE — 94664 DEMO&/EVAL PT USE INHALER: CPT

## 2025-03-25 PROCEDURE — 94618 PULMONARY STRESS TESTING: CPT

## 2025-03-25 PROCEDURE — 94799 UNLISTED PULMONARY SVC/PX: CPT

## 2025-03-25 RX ORDER — IPRATROPIUM BROMIDE AND ALBUTEROL SULFATE 2.5; .5 MG/3ML; MG/3ML
3 SOLUTION RESPIRATORY (INHALATION) 3 TIMES DAILY PRN
Qty: 360 ML | Refills: 0 | Status: SHIPPED | OUTPATIENT
Start: 2025-03-25

## 2025-03-25 RX ORDER — PREDNISONE 20 MG/1
TABLET ORAL
Qty: 9 TABLET | Refills: 0 | Status: SHIPPED | OUTPATIENT
Start: 2025-03-25 | End: 2025-04-01

## 2025-03-25 RX ADMIN — LEVOTHYROXINE SODIUM 125 MCG: 0.12 TABLET ORAL at 05:04

## 2025-03-25 RX ADMIN — ASPIRIN 81 MG: 81 TABLET, CHEWABLE ORAL at 09:15

## 2025-03-25 RX ADMIN — ATORVASTATIN CALCIUM 20 MG: 20 TABLET, FILM COATED ORAL at 09:15

## 2025-03-25 RX ADMIN — FLUTICASONE PROPIONATE 1 SPRAY: 50 SPRAY, METERED NASAL at 09:15

## 2025-03-25 RX ADMIN — CETIRIZINE HYDROCHLORIDE 10 MG: 10 TABLET, FILM COATED ORAL at 09:15

## 2025-03-25 RX ADMIN — METHYLPREDNISOLONE SODIUM SUCCINATE 40 MG: 40 INJECTION, POWDER, FOR SOLUTION INTRAMUSCULAR; INTRAVENOUS at 11:10

## 2025-03-25 RX ADMIN — GUAIFENESIN AND DEXTROMETHORPHAN HYDROBROMIDE 1 TABLET: 600; 30 TABLET, EXTENDED RELEASE ORAL at 09:16

## 2025-03-25 RX ADMIN — Medication 1 TABLET: at 09:14

## 2025-03-25 RX ADMIN — FOLIC ACID 1 MG: 1 TABLET ORAL at 09:15

## 2025-03-25 RX ADMIN — ENOXAPARIN SODIUM 40 MG: 100 INJECTION SUBCUTANEOUS at 09:15

## 2025-03-25 NOTE — DISCHARGE SUMMARY
[facility]  DISCHARGE SUMMARY        Date of Admission: 3/22/2025    Date of Discharge:  3/25/2025    Length of stay:  LOS: 2 days     Presenting Problem:   COPD (chronic obstructive pulmonary disease) [J44.9]  Hypoxia [R09.02]  Acute exacerbation of COPD with asthma [J44.1]    Hospital Course     Active Diagnosis During Hospital Stay/Discharge Diagnoses/Course by Diagnoses:       Active Hospital Problems    Diagnosis  POA   • **COPD (chronic obstructive pulmonary disease) [J44.9]  Yes      Resolved Hospital Problems   No resolved problems to display.     Acute exacerbation of COPD with hypoxia d/t viral pneumonia caused by human metapneumovirus   - Procalcitonin is negative and she has no white count or thrombocytopenia  - continue mucinex, singulair, and flonase  - continue PO prednisone taper at d/c   - continue home inhaler regimen  - will send home on nebulizer and duonebs   - walking O2 prior to d/c and will order O2 if indicated      Hypothyroidism  - Will continue patient's current replacement dose, patient is only taking 125mcg daily, incorrectly getting 200mcg daily      Dyslipidemia  - continue statin      History of polycythemia vera  - She was followed by [place] previously.  Appears her last clinic visit was in 2021.  Can consider repeat referral outpt per primary    - CBC was at her [facility]  Patient is a 59 y.o. female presented with shortness of breath due to issues as noted above was found to have human metapneumovirus and viral pneumonia.  She was admitted and over the course of several days she improved she was able to ambulate around the room.  She will be checked for home oxygen prior to discharge.  She was felt stable to discharge home.        Procedures Performed:as noted          Consults:   Consults       No orders found from 2/21/2025 to 3/23/2025.              Pertinent  and/or Most Recent Results       Pertinent Test Results:      Results from last 7 days   Lab Units 03/22/25  0254   WBC 10*3/mm3 6.64   HEMOGLOBIN g/dL 16.4*   HEMATOCRIT % 49.9*   PLATELETS 10*3/mm3 165     Results from last 7 days   Lab Units 03/22/25  0254   SODIUM mmol/L 136   POTASSIUM mmol/L 3.8   CHLORIDE mmol/L 101   CO2 mmol/L 24.2   BUN mg/dL 8   CREATININE mg/dL 0.58   CALCIUM mg/dL 9.1   BILIRUBIN mg/dL 0.4   ALK PHOS U/L 68   ALT (SGPT) U/L 16   AST (SGOT) U/L 21   GLUCOSE mg/dL 100*       Microbiology Results (last 10 days)       Procedure Component Value - Date/Time    Respiratory Panel PCR w/COVID-19(SARS-CoV-2) ANTONIETA/GORDY/DEVENDRA/PAD/COR/JUAN A In-House, NP Swab in UTM/VTM, 2 HR TAT - Swab, Nasopharynx [[id number]]  (Abnormal) Collected: 03/22/25 0546    Lab Status: Final result Specimen: Swab from Nasopharynx Updated: 03/22/25 1255     ADENOVIRUS, PCR Not Detected     Coronavirus 229E Not Detected     Coronavirus HKU1 Not Detected     Coronavirus NL63 Not Detected     Coronavirus OC43 Not Detected     COVID19 Not Detected     Human Metapneumovirus Detected     Human Rhinovirus/Enterovirus Not Detected     Influenza A PCR Not Detected     Influenza B PCR Not Detected     Parainfluenza Virus 1 Not Detected     Parainfluenza Virus 2 Not Detected     Parainfluenza Virus 3 Not Detected     Parainfluenza Virus 4 Not Detected     RSV, PCR Not Detected     Bordetella pertussis pcr Not Detected     Bordetella parapertussis PCR Not Detected     Chlamydophila pneumoniae PCR Not Detected     Mycoplasma pneumo by PCR Not Detected    Narrative:      In the setting of a positive respiratory panel with a viral infection PLUS a negative procalcitonin without other underlying concern for bacterial infection, consider observing off antibiotics or discontinuation of antibiotics and continue supportive care. If the respiratory panel is positive for atypical bacterial infection (Bordetella pertussis, Chlamydophila pneumoniae, or Mycoplasma pneumoniae), consider antibiotic de-escalation  to target atypical bacterial infection.                Imaging Results (All)       Procedure Component Value Units Date/Time    XR Chest 1 View [[id number]] Collected: 03/22/25 0324     Updated: 03/22/25 0400    Narrative:      XR CHEST 1 VW    Date of Exam: 3/22/2025 3:10 AM EDT    Indication: cough    Comparison: 3/23/2025, 9/11/2023.    Findings:  Mild patchy airspace disease is present within the lung bases bilaterally superimposed on chronic appearing interstitial changes.. No pleural fluid. No pneumothorax. The pulmonary vasculature appears within normal limits. The cardiac and mediastinal   silhouette appear unremarkable. No acute osseous abnormality identified.      Impression:      Impression:  Mild patchy airspace disease within the lung bases bilaterally superimposed on chronic appearing interstitial changes, likely related to a mild pneumonia.        Electronically Signed: Audrey Crawford MD    3/22/2025 3:25 AM EDT    Workstation ID: [id number]              Day of Discharge       Condition on Discharge:  stable     Vital Signs  Temp:  [97 °F (36.1 °C)-97.9 °F (36.6 °C)] 97.4 °F (36.3 °C)  Heart Rate:  [] 78  Resp:  [12-20] 16  BP: (111-123)/(66-78) 123/68    Physical Exam:  Physical Exam  Vitals reviewed.   Cardiovascular:      Rate and Rhythm: Normal rate.   Pulmonary:      Effort: No respiratory distress.   Abdominal:      General: There is no distension.   Neurological:      Mental Status: She is alert and oriented to person, place, and time.   Psychiatric:         Mood and Affect: Mood normal.         Behavior: Behavior normal.             Discharge Disposition  Home or Self Care    Discharge Medications     Discharge Medications        New Medications        Instructions Start Date   predniSONE 20 MG tablet  Commonly known as: DELTASONE   Take 2 tablets by mouth Daily for 3 days, THEN 1 tablet Daily for 2 days, THEN 0.5 tablets Daily for 2 days.   Start Date: March 25, 2025            Continue  These Medications        Instructions Start Date   acetaminophen 325 MG tablet  Commonly known as: TYLENOL   650 mg, Oral, Every 4 Hours PRN      acetaminophen 500 MG tablet  Commonly known as: TYLENOL   500 mg, Every 6 Hours PRN      albuterol sulfate  (90 Base) MCG/ACT inhaler  Commonly known as: PROVENTIL HFA;VENTOLIN HFA;PROAIR HFA   2 puffs, Inhalation, Every 4 Hours PRN      aspirin 81 MG chewable tablet   81 mg, Daily      atorvastatin 20 MG tablet  Commonly known as: LIPITOR   20 mg, Daily      azithromycin 250 MG tablet  Commonly known as: Zithromax Z-Graeme   Take 1 tablet daily for 4 days. Began tomorrow.      benzonatate 100 MG capsule  Commonly known as: TESSALON   200 mg, 3 Times Daily PRN      Calcium Carb-Cholecalciferol 600-5 MG-MCG tablet   1 tablet, Oral, 2 Times Daily      cetirizine 10 MG tablet  Commonly known as: zyrTEC   10 mg, Daily      cyclobenzaprine 10 MG tablet  Commonly known as: FLEXERIL   10 mg, Oral, 3 Times Daily PRN      dextromethorphan-guaifenesin  MG per 12 hr tablet  Commonly known as: MUCINEX DM   1 tablet, Every 12 Hours      fluconazole 150 MG tablet  Commonly known as: DIFLUCAN   150 mg, Oral, Daily      fluticasone 50 MCG/ACT nasal spray  Commonly known as: FLONASE   1 spray, Daily      folic acid 1 MG tablet  Commonly known as: FOLVITE   1 mg, Daily      ipratropium-albuterol 0.5-2.5 mg/3 ml nebulizer  Commonly known as: DUO-NEB   3 mL, Nebulization, 3 Times Daily PRN      levothyroxine 75 MCG tablet  Commonly known as: SYNTHROID, LEVOTHROID   TAKE 1 TABLET BY MOUTH EVERY DAY ON EMPTY STOMACH IN THE MORNING      levothyroxine 125 MCG tablet  Commonly known as: SYNTHROID, LEVOTHROID   125 mcg, Every Early Morning      montelukast 10 MG tablet  Commonly known as: SINGULAIR   10 mg, Nightly      multivitamin tablet tablet   1 tablet, Daily      multivitamin with minerals tablet tablet   1 tablet, Daily      Trelegy Ellipta 100-62.5-25 MCG/INH inhaler  Generic  drug: Fluticasone-Umeclidin-Vilant   1 each, Daily      vitamin D 1.25 MG (62815 UT) capsule capsule  Commonly known as: ERGOCALCIFEROL   50,000 Units, Oral, Every 14 Days               Discharge Diet:     Activity at Discharge:   Activity Instructions       Activity as Tolerated              Follow-up Appointments  No future appointments.  Additional Instructions for the Follow-ups that You Need to Schedule       Discharge Follow-up with PCP   As directed       Currently Documented PCP:    Yodit Reyes APRN    PCP Phone Number:    [phone]     Follow Up Details: one week                Test Results Pending at Discharge       Risk for Readmission (LACE) Score: 9 (3/25/2025  6:00 AM)        Time: Discharge 34 min with face-to-face history exam, writing of prescriptions, and documenting discharge data including care coordination with the nursing staff.      Electronically signed by Yony Page DO, 03/25/25, 10:44 EDT.      Note disclaimer: At [facility], we believe that sharing information builds trust and better relationships. You are receiving this note because you recently visited [facility]. It is possible you will see health information before a provider has talked with you about it. This kind of information can be easy to misunderstand. To help you fully understand what it means for your health, we urge you to discuss this note with your provider.

## 2025-03-25 NOTE — CASE MANAGEMENT/SOCIAL WORK
Continued Stay Note   [place]     Patient Name: Irais Bolivar  MRN: [id number]  Today's Date: 3/25/2025    Admit Date: 3/22/2025    Plan: plan home, lives alone   Discharge Plan       Row Name 03/25/25 1239       Plan    Plan plan home, lives alone    Patient/Family in Agreement with Plan yes    Plan Comments Noted order for home 02 and nebulizer. Spoke with [facility], clinicals faxed. Phil will verify they are in network with patient insurance and return call for ability to accept patient. Shanna DOCKERY updated. CM will continue to follow.      Row Name 03/25/25 1144       Plan    Plan     Patient/Family in Agreement with Plan     Plan Comments                    Discharge Codes    No documentation.                 Expected Discharge Date and Time       Expected Discharge Date Expected Discharge Time    Mar 25, 2025               Guille Thorpe RN

## 2025-03-25 NOTE — CASE MANAGEMENT/SOCIAL WORK
Continued Stay Note   [facility]     Patient Name: Irais Bolivar  MRN: [id number]  Today's Date: 3/25/2025    Admit Date: 3/22/2025       Discharge Plan       Row Name 03/25/25 1144       Plan         Patient/Family in Agreement with Plan     Plan Comments                    Discharge Codes    No documentation.                 Expected Discharge Date and Time       Expected Discharge Date Expected Discharge Time    Mar 25, 2025               Guille Thorpe RN     [facility] Pregnancy And Lactation Text: This medication is Pregnancy Category X and is absolutely contraindicated during pregnancy. It is unknown if it is excreted in breast milk.

## 2025-03-25 NOTE — DISCHARGE PLACEMENT REQUEST
"Johnny Bolivar \"Johnny Bolivar\" (59 y.o. Female)       Date of Birth   1965    Social Security Number       Address   [address] [facility] [place] [address]    Home Phone   [phone]    MRN   [id number]       [facility]    Marital Status   Single                            Admission Date   3/22/2025    Admission Type   Emergency    Admitting Provider   Cameron Buitrago MD    Attending Provider   Cameron Buitrago MD    Department, Room/Bed   [facility] MED SURG, 1417/1       Discharge Date       Discharge Disposition   Home or Self Care    Discharge Destination                                 Attending Provider: Cameron Buitrago MD    Allergies: Budesonide    Isolation: Contact   Infection: Human Metapneumovirus  (03/22/25)   Code Status: CPR    Ht: 162.6 cm (64\")   Wt: 65.8 kg (145 lb)    Admission Cmt: None   Principal Problem: COPD (chronic obstructive pulmonary disease) [J44.9]                   Active Insurance as of 3/22/2025       Primary Coverage       Payor Plan Insurance Group Employer/Plan Group    [id number] [id number]       Payor Plan Address Payor Plan Phone Number Payor Plan Fax Number Effective Dates    [address] [address] [phone]  9/1/2024 - None Entered    [facility] [address]         Subscriber Name Subscriber Birth Date Member ID       JOHNNY BOLIVAR 1965 [id number]                     Emergency Contacts        (Rel.) Home Phone Work Phone Mobile Phone    candedon colon (Son) -- -- [phone]    Segun Brumfield (Daughter) -- -- [phone]                "

## 2025-03-25 NOTE — DISCHARGE INSTR - OTHER ORDERS
[facility]  [phone]    A portable oxygen tank from [employer] has been provided to get you home at discharge. Once you arrive home, call [employer] to arrange for an oxygen concentrator to be delivered to your home. They will deliver a nebulizer to you when they deliver the oxygen concentrator.

## 2025-03-25 NOTE — PROGRESS NOTES
Exercise Oximetry    Patient Name:Irais Bolivar   MRN: [id number]   Date: 03/25/25             ROOM AIR Baseline at rest   SpO2%  90   Heart Rate  80     EXERCISE  SpO2% Supplemental Oxygen, LPM   1 MINUTE 83 RA , pt placed on NC at 1 LPM increased to 2 LPM - required 3 min rest to recover       Oxygen Baseline at rest   Oxygen (LPM) 2  lpm   SpO2%  90     EXERCISE  SpO2% Supplemental Oxygen, LPM   1 MINUTE 88 Increased to 3 then 4 lpm   2 MINUTES 90 4 lpm   3 MINUTES 89 4 lpm   4 MINUTES 88 4 lpm   5 MINUTES 90 4 lpm   6 MINUTES 92 4 lpm     Recovery       Time to Baseline/Recovery (minutes) 1 min 30 seconds   SpO2 % 93   Oxygen  RA     Distance Walked (meters) 100 meters       Comments: one rest due to desaturation at minute 4 x 30 seconds.  Encouraged pursed lip breathing.

## 2025-03-25 NOTE — PLAN OF CARE
Goal Outcome Evaluation:              Outcome Evaluation: VSS, 2L O2, indep, AOx4, IV steroids

## 2025-03-26 NOTE — PAYOR COMM NOTE
"Johnny Bolivar \"Johnny Bolivar\" (59 y.o. Female)       Date of Birth   1965    Social Security Number       Address   [address] [facility] [place] [address]    Home Phone   [phone]    MRN   [id number]       [facility]    Marital Status   Single                            Admission Date   3/22/2025    Admission Type   Emergency    Admitting Provider   Cameron Buitrago MD    Attending Provider       Department, Room/Bed   [facility] MED SURG, 1417/1       Discharge Date   3/25/2025    Discharge Disposition   Home or Self Care    Discharge Destination                                 Attending Provider: (none)   Allergies: Budesonide    Isolation: None   Infection: Human Metapneumovirus  (03/22/25)   Code Status: Prior    Ht: 162.6 cm (64\")   Wt: 65.8 kg (145 lb)    Admission Cmt: None   Principal Problem: COPD (chronic obstructive pulmonary disease) [J44.9]                   Active Insurance as of 3/22/2025       Primary Coverage       Payor Plan Insurance Group Employer/Plan Group    [facility] [id number]       Payor Plan Address Payor Plan Phone Number Payor Plan Fax Number Effective Dates    [address] [address] [phone]  9/1/2024 - None Entered    [facility] [address]         Subscriber Name Subscriber Birth Date Member ID       JOHNNY BOLIVAR 1965 [id number]                     Emergency Contacts        (Rel.) Home Phone Work Phone Mobile Phone    candedon (Son) -- -- [phone]    ChelseyedwardmaliSatyahandmalcolm (Daughter) -- -- [phone]              Insurance Information                  [facility] Phone: [phone]    Subscriber: Johnny Bolivar Subscriber#: [id number]    Group#: [id number] Precert#: --    Authorization#: [id number] Effective Date: --             Discharge Summary        Yony Page DO at 03/25/25 1044            [facility] Medicine Services  DISCHARGE SUMMARY        Date of Admission: 3/22/2025    Date of " Discharge:  3/25/2025    Length of stay:  LOS: 2 days     Presenting Problem:   COPD (chronic obstructive pulmonary disease) [J44.9]  Hypoxia [R09.02]  Acute exacerbation of COPD with asthma [J44.1]    Hospital Course     Active Diagnosis During Hospital Stay/Discharge Diagnoses/Course by Diagnoses:       Active Hospital Problems    Diagnosis  POA    **COPD (chronic obstructive pulmonary disease) [J44.9]  Yes      Resolved Hospital Problems   No resolved problems to display.     Acute exacerbation of COPD with hypoxia d/t viral pneumonia caused by human metapneumovirus   - Procalcitonin is negative and she has no white count or thrombocytopenia  - continue mucinex, singulair, and flonase  - continue PO prednisone taper at d/c   - continue home inhaler regimen  - will send home on nebulizer and duonebs   - walking O2 prior to d/c and will order O2 if indicated      Hypothyroidism  - Will continue patient's current replacement dose, patient is only taking 125mcg daily, incorrectly getting 200mcg daily      Dyslipidemia  - continue statin      History of polycythemia vera  - She was followed by [place] [facility] previously.  Appears her last clinic visit was in 2021.  Can consider repeat referral outpt per primary    - CBC was at her [facility] Course  Patient is a 59 y.o. female presented with shortness of breath due to issues as noted above was found to have human metapneumovirus and viral pneumonia.  She was admitted and over the course of several days she improved she was able to ambulate around the room.  She will be checked for home oxygen prior to discharge.  She was felt stable to discharge home.        Procedures Performed:as noted          Consults:   Consults       No orders found from 2/21/2025 to 3/23/2025.              Pertinent  and/or Most Recent Results       Pertinent Test Results:     Results from last 7 days   Lab Units 03/22/25  0254   WBC 10*3/mm3 6.64   HEMOGLOBIN g/dL 16.4*    HEMATOCRIT % 49.9*   PLATELETS 10*3/mm3 165     Results from last 7 days   Lab Units 03/22/25  0254   SODIUM mmol/L 136   POTASSIUM mmol/L 3.8   CHLORIDE mmol/L 101   CO2 mmol/L 24.2   BUN mg/dL 8   CREATININE mg/dL 0.58   CALCIUM mg/dL 9.1   BILIRUBIN mg/dL 0.4   ALK PHOS U/L 68   ALT (SGPT) U/L 16   AST (SGOT) U/L 21   GLUCOSE mg/dL 100*       Microbiology Results (last 10 days)       Procedure Component Value - Date/Time    Respiratory Panel PCR w/COVID-19(SARS-CoV-2) ANTONIETA/GORDY/DEVENDRA/PAD/COR/JUAN A In-House, NP Swab in UTM/VTM, 2 HR TAT - Swab, Nasopharynx [[id number]]  (Abnormal) Collected: 03/22/25 0546    Lab Status: Final result Specimen: Swab from Nasopharynx Updated: 03/22/25 1255     ADENOVIRUS, PCR Not Detected     Coronavirus 229E Not Detected     Coronavirus HKU1 Not Detected     Coronavirus NL63 Not Detected     Coronavirus OC43 Not Detected     COVID19 Not Detected     Human Metapneumovirus Detected     Human Rhinovirus/Enterovirus Not Detected     Influenza A PCR Not Detected     Influenza B PCR Not Detected     Parainfluenza Virus 1 Not Detected     Parainfluenza Virus 2 Not Detected     Parainfluenza Virus 3 Not Detected     Parainfluenza Virus 4 Not Detected     RSV, PCR Not Detected     Bordetella pertussis pcr Not Detected     Bordetella parapertussis PCR Not Detected     Chlamydophila pneumoniae PCR Not Detected     Mycoplasma pneumo by PCR Not Detected    Narrative:      In the setting of a positive respiratory panel with a viral infection PLUS a negative procalcitonin without other underlying concern for bacterial infection, consider observing off antibiotics or discontinuation of antibiotics and continue supportive care. If the respiratory panel is positive for atypical bacterial infection (Bordetella pertussis, Chlamydophila pneumoniae, or Mycoplasma pneumoniae), consider antibiotic de-escalation to target atypical bacterial infection.                Imaging Results (All)       Procedure  Component Value Units Date/Time    XR Chest 1 View [[id number]] Collected: 03/22/25 0324     Updated: 03/22/25 0400    Narrative:      XR CHEST 1 VW    Date of Exam: 3/22/2025 3:10 AM EDT    Indication: cough    Comparison: 3/23/2025, 9/11/2023.    Findings:  Mild patchy airspace disease is present within the lung bases bilaterally superimposed on chronic appearing interstitial changes.. No pleural fluid. No pneumothorax. The pulmonary vasculature appears within normal limits. The cardiac and mediastinal   silhouette appear unremarkable. No acute osseous abnormality identified.      Impression:      Impression:  Mild patchy airspace disease within the lung bases bilaterally superimposed on chronic appearing interstitial changes, likely related to a mild pneumonia.        Electronically Signed: Audrey Crawford MD    3/22/2025 3:25 AM EDT    Workstation ID: [id number]              Day of Discharge       Condition on Discharge:  stable     Vital Signs  Temp:  [97 °F (36.1 °C)-97.9 °F (36.6 °C)] 97.4 °F (36.3 °C)  Heart Rate:  [] 78  Resp:  [12-20] 16  BP: (111-123)/(66-78) 123/68    Physical Exam:  Physical Exam  Vitals reviewed.   Cardiovascular:      Rate and Rhythm: Normal rate.   Pulmonary:      Effort: No respiratory distress.   Abdominal:      General: There is no distension.   Neurological:      Mental Status: She is alert and oriented to person, place, and time.   Psychiatric:         Mood and Affect: Mood normal.         Behavior: Behavior normal.             Discharge Disposition  Home or Self Care    Discharge Medications     Discharge Medications        New Medications        Instructions Start Date   predniSONE 20 MG tablet  Commonly known as: DELTASONE   Take 2 tablets by mouth Daily for 3 days, THEN 1 tablet Daily for 2 days, THEN 0.5 tablets Daily for 2 days.   Start Date: March 25, 2025            Continue These Medications        Instructions Start Date   acetaminophen 325 MG tablet  Commonly known  as: TYLENOL   650 mg, Oral, Every 4 Hours PRN      acetaminophen 500 MG tablet  Commonly known as: TYLENOL   500 mg, Every 6 Hours PRN      albuterol sulfate  (90 Base) MCG/ACT inhaler  Commonly known as: PROVENTIL HFA;VENTOLIN HFA;PROAIR HFA   2 puffs, Inhalation, Every 4 Hours PRN      aspirin 81 MG chewable tablet   81 mg, Daily      atorvastatin 20 MG tablet  Commonly known as: LIPITOR   20 mg, Daily      azithromycin 250 MG tablet  Commonly known as: Zithromax Z-Graeme   Take 1 tablet daily for 4 days. Began tomorrow.      benzonatate 100 MG capsule  Commonly known as: TESSALON   200 mg, 3 Times Daily PRN      Calcium Carb-Cholecalciferol 600-5 MG-MCG tablet   1 tablet, Oral, 2 Times Daily      cetirizine 10 MG tablet  Commonly known as: zyrTEC   10 mg, Daily      cyclobenzaprine 10 MG tablet  Commonly known as: FLEXERIL   10 mg, Oral, 3 Times Daily PRN      dextromethorphan-guaifenesin  MG per 12 hr tablet  Commonly known as: MUCINEX DM   1 tablet, Every 12 Hours      fluconazole 150 MG tablet  Commonly known as: DIFLUCAN   150 mg, Oral, Daily      fluticasone 50 MCG/ACT nasal spray  Commonly known as: FLONASE   1 spray, Daily      folic acid 1 MG tablet  Commonly known as: FOLVITE   1 mg, Daily      ipratropium-albuterol 0.5-2.5 mg/3 ml nebulizer  Commonly known as: DUO-NEB   3 mL, Nebulization, 3 Times Daily PRN      levothyroxine 75 MCG tablet  Commonly known as: SYNTHROID, LEVOTHROID   TAKE 1 TABLET BY MOUTH EVERY DAY ON EMPTY STOMACH IN THE MORNING      levothyroxine 125 MCG tablet  Commonly known as: SYNTHROID, LEVOTHROID   125 mcg, Every Early Morning      montelukast 10 MG tablet  Commonly known as: SINGULAIR   10 mg, Nightly      multivitamin tablet tablet   1 tablet, Daily      multivitamin with minerals tablet tablet   1 tablet, Daily      Trelegy Ellipta 100-62.5-25 MCG/INH inhaler  Generic drug: Fluticasone-Umeclidin-Vilant   1 each, Daily      vitamin D 1.25 MG (70212 UT) capsule  capsule  Commonly known as: ERGOCALCIFEROL   50,000 Units, Oral, Every 14 Days               Discharge Diet:     Activity at Discharge:   Activity Instructions       Activity as Tolerated              Follow-up Appointments  No future appointments.  Additional Instructions for the Follow-ups that You Need to Schedule       Discharge Follow-up with PCP   As directed       Currently Documented PCP:    Yodit Reyes APRN    PCP Phone Number:    [phone]     Follow Up Details: one week                Test Results Pending at Discharge       Risk for Readmission (LACE) Score: 9 (3/25/2025  6:00 AM)        Time: Discharge 34 min with face-to-face history exam, writing of prescriptions, and documenting discharge data including care coordination with the nursing staff.      Electronically signed by oYny Page DO, 03/25/25, 10:44 EDT.      Note disclaimer: At [facility], we believe that sharing information builds trust and better relationships. You are receiving this note because you recently visited [facility]. It is possible you will see health information before a provider has talked with you about it. This kind of information can be easy to misunderstand. To help you fully understand what it means for your health, we urge you to discuss this note with your provider.           Electronically signed by Yony Page DO at 03/25/25 2914

## 2025-03-26 NOTE — OUTREACH NOTE
Prep Survey      Flowsheet Row Responses   Yazidism facility patient discharged from? [facility]   Is LACE score < 7 ? No   Eligibility Readm Mgmt   Discharge diagnosis *COPD   Does the patient have one of the following disease processes/diagnoses(primary or secondary)? COPD   Does the patient have Home health ordered? No   Is there a DME ordered? Yes   What DME was ordered? home 02 and nebulizer. Spoke with [facility]   Prep survey completed? Yes            GEM ESQUEDA - Registered Nurse

## 2025-03-27 NOTE — CASE MANAGEMENT/SOCIAL WORK
Case Management Discharge Note      Final Note: dc home         Selected Continued Care - Discharged on 3/25/2025 Admission date: 3/22/2025 - Discharge disposition: Home or Self Care      Destination    No services have been selected for the patient.                Durable Medical Equipment       Service Provider Services Address Phone Fax Patient Preferred    [facility] [address] [id number] [phone] --              Dialysis/Infusion    No services have been selected for the patient.                Home Medical Care    No services have been selected for the patient.                Therapy    No services have been selected for the patient.                Community Resources    No services have been selected for the patient.                Community & DME    No services have been selected for the patient.                         Final Discharge Disposition Code: 01 - home or self-care

## 2025-04-03 ENCOUNTER — READMISSION MANAGEMENT (OUTPATIENT)
Dept: CALL CENTER | Facility: HOSPITAL | Age: 60
End: 2025-04-03
Payer: COMMERCIAL

## 2025-04-03 NOTE — OUTREACH NOTE
COPD/PN Week 1 Survey      Flowsheet Row Responses   Pentecostalism facility patient discharged from? [facility]   Does the patient have one of the following disease processes/diagnoses(primary or secondary)? COPD   Week 1 attempt successful? No   Unsuccessful attempts Attempt 1            GEM WORKMAN - Registered Nurse

## 2025-04-07 ENCOUNTER — READMISSION MANAGEMENT (OUTPATIENT)
Dept: CALL CENTER | Facility: HOSPITAL | Age: 60
End: 2025-04-07
Payer: COMMERCIAL

## 2025-04-07 NOTE — OUTREACH NOTE
COPD/PN Week 1 Survey      Flowsheet Row Responses   Islam facility patient discharged from? [facility]   Does the patient have one of the following disease processes/diagnoses(primary or secondary)? COPD   Week 1 attempt successful? No   Unsuccessful attempts Attempt 2            Noel ESQUEDA - Registered Nurse

## 2025-04-11 ENCOUNTER — READMISSION MANAGEMENT (OUTPATIENT)
Dept: CALL CENTER | Facility: HOSPITAL | Age: 60
End: 2025-04-11
Payer: COMMERCIAL

## 2025-04-11 NOTE — OUTREACH NOTE
COPD/PN Week 1 Survey      Flowsheet Row Responses   Buddhism facility patient discharged from? [place]   Does the patient have one of the following disease processes/diagnoses(primary or secondary)? COPD   Week 1 attempt successful? No   Unsuccessful attempts Attempt 3            Grace GARCIA - Registered Nurse

## 2025-07-03 ENCOUNTER — APPOINTMENT (OUTPATIENT)
Dept: GENERAL RADIOLOGY | Facility: HOSPITAL | Age: 60
End: 2025-07-03
Payer: COMMERCIAL

## 2025-07-03 ENCOUNTER — HOSPITAL ENCOUNTER (EMERGENCY)
Facility: HOSPITAL | Age: 60
Discharge: HOME | End: 2025-07-03
Payer: COMMERCIAL

## 2025-07-03 VITALS
SYSTOLIC BLOOD PRESSURE: 130 MMHG | OXYGEN SATURATION: 93 % | TEMPERATURE: 97.6 F | DIASTOLIC BLOOD PRESSURE: 80 MMHG | WEIGHT: 148 LBS | RESPIRATION RATE: 18 BRPM | HEIGHT: 62 IN | BODY MASS INDEX: 27.23 KG/M2 | HEART RATE: 89 BPM

## 2025-07-03 DIAGNOSIS — J18.9 PNEUMONIA OF LEFT LOWER LOBE DUE TO INFECTIOUS ORGANISM: Primary | ICD-10-CM

## 2025-07-03 PROCEDURE — 99283 EMERGENCY DEPT VISIT LOW MDM: CPT

## 2025-07-03 PROCEDURE — 71046 X-RAY EXAM CHEST 2 VIEWS: CPT

## 2025-07-03 RX ORDER — AZITHROMYCIN 250 MG/1
TABLET, FILM COATED ORAL
Qty: 6 TABLET | Refills: 0 | Status: SHIPPED | OUTPATIENT
Start: 2025-07-03

## 2025-07-03 RX ORDER — AZITHROMYCIN 250 MG/1
500 TABLET, FILM COATED ORAL ONCE
Status: COMPLETED | OUTPATIENT
Start: 2025-07-03 | End: 2025-07-03

## 2025-07-03 RX ADMIN — AMOXICILLIN AND CLAVULANATE POTASSIUM 1 TABLET: 875; 125 TABLET, FILM COATED ORAL at 17:42

## 2025-07-03 RX ADMIN — AZITHROMYCIN DIHYDRATE 500 MG: 250 TABLET, FILM COATED ORAL at 17:42

## 2025-07-03 NOTE — DISCHARGE INSTRUCTIONS
Prescription is sent for Augmentin and azithromycin to take.   using your inhaler as discussed and may use a breathing treatment if you needed  May take Tylenol ibuprofen for pain   follow-up with your primary care doctor   you may resume work as you requested however you need to monitor your symptoms and listen to your body please

## 2025-07-03 NOTE — ED PROVIDER NOTES
CHIEF CONCERN   Chief Complaint   Patient presents with    Rib Pain     Patient here today with c/o dry cough, rib pain from coughing. Patient febrile approximately two nights ago with chills. Onset 3-4 days ago. Worsening.        Subjective     History of Present Illness  This is a patient with a history of COPD presenting with concerns of pneumonia.    The patient reports feeling like she has pneumonia again, a condition she was diagnosed with in 02/2025 and subsequently hospitalized for in 03/2025. She reports no chest pain or shortness of breath but mentions a non-productive cough. She experienced a fever once. She also reports rib pain and lung discomfort, which she attributes to her persistent coughing. The patient is seeking a chest x-ray to confirm or rule out pneumonia. 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 She has not been swabbed for any infections. She has been managing her symptoms with Mucinex.    SOCIAL HISTORY  She works in healthcare.      Review of Systems   Constitutional:  Negative for chills, fatigue and fever.   Respiratory:  Positive for cough. Negative for shortness of breath.    Cardiovascular:  Negative for chest pain.   Musculoskeletal:  Positive for back pain.       Past Medical History:   Diagnosis Date    Cancer     cervical 16 yrs ago    Cholelithiasis 11/2018    COPD (chronic obstructive pulmonary disease) 11/28/2018    Disease of thyroid gland     HYPOTHYROID    Gallstones     SCHEDULED FOR  LAP CHRISTIANO    History of transfusion     Migraine     Pneumothorax     right    Umbilical hernia     SCHEDULED FOR REPAIR       Past Surgical History:   Procedure Laterality Date    BLADDER REPAIR      CHEST TUBE INSERTION      27 yrs ago    CHOLECYSTECTOMY N/A 9/11/2019    Procedure: CHOLECYSTECTOMY LAPAROSCOPIC;  Surgeon: Antony Boogie MD;  Location: [facility];  Service: General    DILATATION AND CURETTAGE      HYSTERECTOMY      LAP     UMBILICAL HERNIA REPAIR N/A 2019    Procedure: UMBILICAL HERNIA REPAIR;  Surgeon: Antony Boogie MD;  Location: [facility];  Service: General       Family History   Problem Relation Age of Onset    Lung disease Mother     Dementia Father     No Known Problems Sister     Asthma Daughter     Cancer Daughter     Miscarriages / Stillbirths Daughter     No Known Problems Son     Diabetes Maternal Grandmother     Heart disease Maternal Grandmother     No Known Problems Sister     No Known Problems Sister     No Known Problems Sister        Social History     Socioeconomic History    Marital status: Single   Tobacco Use    Smoking status: Former     Current packs/day: 0.00     Average packs/day: 1 pack/day for 25.0 years (25.0 ttl pk-yrs)     Types: Cigarettes     Start date: 1993     Quit date: 2018     Years since quittin.6    Smokeless tobacco: Never   Vaping Use    Vaping status: Never Used   Substance and Sexual Activity    Alcohol use: No    Drug use: No    Sexual activity: Yes     Partners: Male     Birth control/protection: Other, None       Allergies   Allergen Reactions    Budesonide Hives     Immediate hives all over body following nebulized budesonide (tolerates home trelegy)       No current facility-administered medications on file prior to encounter.     Current Outpatient Medications on File Prior to Encounter   Medication Sig Dispense Refill    acetaminophen (TYLENOL) 325 MG tablet Take 2 tablets by mouth Every 4 (Four) Hours As Needed for Mild Pain .      acetaminophen (TYLENOL) 500 MG tablet Take 1 tablet by mouth Every 6 (Six) Hours As Needed for Mild Pain, Headache or Fever.      albuterol 108 (90 Base) MCG/ACT inhaler Inhale 2 puffs Every 4 (Four) Hours As Needed for Wheezing or Shortness of Air. 1 inhaler 1    aspirin 81 MG chewable tablet Chew 1 tablet Daily.      atorvastatin (LIPITOR) 20 MG tablet Take 1 tablet by mouth Daily.      benzonatate (TESSALON) 100 MG capsule Take 2  "capsules by mouth 3 (Three) Times a Day As Needed for Cough.      Calcium Carb-Cholecalciferol 600-5 MG-MCG tablet Take 1 tablet by mouth 2 (Two) Times a Day.      cetirizine (zyrTEC) 10 MG tablet Take 1 tablet by mouth Daily.      cyclobenzaprine (FLEXERIL) 10 MG tablet Take 1 tablet by mouth 3 (Three) Times a Day As Needed for Muscle Spasms. 30 tablet 0    dextromethorphan-guaifenesin (MUCINEX DM)  MG per 12 hr tablet Take 1 tablet by mouth Every 12 (Twelve) Hours.      fluconazole (DIFLUCAN) 150 MG tablet Take 1 tablet by mouth Daily.      fluticasone (FLONASE) 50 MCG/ACT nasal spray Administer 1 spray into the nostril(s) as directed by provider Daily.      Fluticasone-Umeclidin-Vilant (TRELEGY ELLIPTA) 100-62.5-25 MCG/INH aerosol powder  Inhale 1 each Daily.      folic acid (FOLVITE) 1 MG tablet Take 1 tablet by mouth Daily.      ipratropium-albuterol (DUO-NEB) 0.5-2.5 mg/3 ml nebulizer Take 3 mL by nebulization 3 (Three) Times a Day As Needed for Wheezing or Shortness of Air. 360 mL 0    levothyroxine (SYNTHROID, LEVOTHROID) 125 MCG tablet Take 1 tablet by mouth Every Morning.      levothyroxine (SYNTHROID, LEVOTHROID) 75 MCG tablet TAKE 1 TABLET BY MOUTH EVERY DAY ON EMPTY STOMACH IN THE MORNING  1    montelukast (SINGULAIR) 10 MG tablet Take 1 tablet by mouth Every Night.      Multiple Vitamins-Minerals (MULTIVITAMIN WITH MINERALS) tablet tablet Take 1 tablet by mouth Daily.      multivitamin (THERAGRAN) tablet tablet Take 1 tablet by mouth Daily.      vitamin D (ERGOCALCIFEROL) 1.25 MG (02192 UT) capsule capsule Take 1 capsule by mouth Every 14 (Fourteen) Days.         /80 (BP Location: Right arm, Patient Position: Sitting)   Pulse 89   Temp 97.6 °F (36.4 °C) (Oral)   Resp 18   Ht 157.5 cm (62\")   Wt 67.1 kg (148 lb)   SpO2 93%   BMI 27.07 kg/m²     Objective     Physical Exam  Vitals and nursing note reviewed.   Constitutional:       General: She is not in acute distress.     Appearance: " She is not ill-appearing, toxic-appearing or diaphoretic.   HENT:      Head: Normocephalic and atraumatic.      Nose: No congestion.      Mouth/Throat:      Pharynx: No oropharyngeal exudate or posterior oropharyngeal erythema.   Eyes:      Conjunctiva/sclera: Conjunctivae normal.   Cardiovascular:      Rate and Rhythm: Normal rate.   Pulmonary:      Effort: No tachypnea, accessory muscle usage, prolonged expiration or respiratory distress.      Breath sounds: Examination of the right-lower field reveals decreased breath sounds. Examination of the left-lower field reveals decreased breath sounds. Decreased breath sounds present.   Chest:      Chest wall: Tenderness present.       Abdominal:      General: Bowel sounds are normal.   Musculoskeletal:      Cervical back: Neck supple.   Skin:     Capillary Refill: Capillary refill takes less than 2 seconds.   Psychiatric:         Mood and Affect: Mood normal.         Behavior: Behavior normal.         Procedures         ED Course  ED Course as of 07/05/25 0921   Thu Jul 03, 2025   1711 Discussed results with patient and suspicion of left lower lobe  pneumonia.  Will discharge home.  Patient wants to go back to work and does not want to be off.  Patient is not short of breath.  Is not currently wheezing.  Will start on antibiotics  pending DC. [SN]      ED Course User Index  [SN] Segundo, Naresh Yuan, VAISHALI       Lab Results (last 24 hours)       ** No results found for the last 24 hours. **          XR Chest 2 View  Result Date: 7/3/2025  Impression: 1. Pulmonary emphysema 2. Left basilar infiltrate suspicious for pneumonia Electronically Signed: Dong Zhong  7/3/2025 4:49 PM EDT  Workstation ID: [id number]             Medical Decision Making  Patient is 59 years old female who presents for Suspected pneumonia with non-productive cough, occasional fever, and rib pain. History of pneumonia in February and hospitalization in March.     Vitals notable for oxygen saturation of  93% on arrival, baseline for patient and improved since last visit upon reviewing records  -Overall well appearing and nontoxic. Presented with dry cough and rib pain. Diminished breath sounds present on the [R/L]. Initial chest XRay concerning for pneumonia. No respiratory distress.  Other etiologies for cough such as PE, ACS, CHF, Dissection, COPD were considered but based on exam and findings below, I think it is most likely due to uncomplicated pneumonia. Antibiotics started here. Stable on RA. Discussed findings at length with patient but she elected to go back to work. Also discussed that there is always a possibility that they may get worse regardless of our treatment decisions. She understood and stated they would like to go home with outpatient treatment and prompt PCP followup within the next 48 hours. Return precautions were extensively discussed and they understand to return for any worsening symptoms or failure to improve.    Problems Addressed:  Pneumonia of left lower lobe due to infectious organism: complicated acute illness or injury    Risk  Prescription drug management.        Diagnoses and all orders for this visit:    1. Pneumonia of left lower lobe due to infectious organism (Primary)    Other orders  -     XR Chest 2 View; Standing  -     XR Chest 2 View  -     azithromycin (ZITHROMAX) tablet 500 mg  -     amoxicillin-clavulanate (AUGMENTIN) 875-125 MG per tablet; Take 1 tablet by mouth 2 (Two) Times a Day for 10 days.  Dispense: 20 tablet; Refill: 0  -     azithromycin (ZITHROMAX) 250 MG tablet; Take 2 tablets the first day, then 1 tablet daily for 4 days.  Dispense: 6 tablet; Refill: 0  -     amoxicillin-clavulanate (AUGMENTIN) 875-125 MG per tablet 1 tablet        Final diagnoses:   Pneumonia of left lower lobe due to infectious organism        Follow-up Information       Yodit Reyes APRN. Schedule an appointment as soon as possible for a visit in 3 days.    Specialty: Family  Medicine  Contact information:  [address] [facility] [address]  [phone]                             New Medications Ordered This Visit   Medications    azithromycin (ZITHROMAX) tablet 500 mg    amoxicillin-clavulanate (AUGMENTIN) 875-125 MG per tablet     Sig: Take 1 tablet by mouth 2 (Two) Times a Day for 10 days.     Dispense:  20 tablet     Refill:  0    azithromycin (ZITHROMAX) 250 MG tablet     Sig: Take 2 tablets the first day, then 1 tablet daily for 4 days.     Dispense:  6 tablet     Refill:  0    amoxicillin-clavulanate (AUGMENTIN) 875-125 MG per tablet 1 tablet         Patient or patient representative verbalized consent for the use of Ambient Listening during the visit for chart documentation.  VAISHALI Waters 7/5/2025 09:21 EDT    FOR FULL DISCHARGE INSTRUCTIONS/COMMENTS/HANDOUTS please see the AVS      Naresh Raymond APRN  07/05/25 [id number]

## 2025-07-03 NOTE — Clinical Note
[facility] EMERGENCY DEPARTMENT  [address]  [place] [address]  Phone: [phone]    Irais Bolivar was seen and treated in our emergency department on 7/3/2025.  She may return to work on 07/04/2025.         Thank you for choosing [facility].    Naresh Raymond APRN

## (undated) DEVICE — SUT MNCRYL 4/0 PS2 18 IN

## (undated) DEVICE — LAPAROSCOPIC TROCAR SLEEVE/SINGLE USE: Brand: KII® OPTICAL ACCESS SYSTEM

## (undated) DEVICE — ENDOPOUCH RETRIEVER SPECIMEN RETRIEVAL BAGS: Brand: ENDOPOUCH RETRIEVER

## (undated) DEVICE — DECANTER: Brand: UNBRANDED

## (undated) DEVICE — BNDG ADHS PLSTC 1X3IN LF

## (undated) DEVICE — ENDOPATH XCEL BLUNT TIP TROCARS WITH SMOOTH SLEEVES: Brand: ENDOPATH XCEL

## (undated) DEVICE — SUCTION CANISTER, 1000CC,SAFELINER: Brand: DEROYAL

## (undated) DEVICE — TROCAR: Brand: KII® SLEEVE

## (undated) DEVICE — 2, DISPOSABLE SUCTION/IRRIGATOR WITH DISPOSABLE TIP: Brand: STRYKEFLOW

## (undated) DEVICE — SPNG GZ STRL 2S 4X4 12PLY

## (undated) DEVICE — PK LAP GEN 90

## (undated) DEVICE — LAG MINOR PROCEDURE: Brand: MEDLINE INDUSTRIES, INC.

## (undated) DEVICE — SEALANT HEMOS FLOSEAL MATRX W/MALL TP 5ML EA/6

## (undated) DEVICE — SUT ETHIB 0/0 MO6 I8IN CX45D

## (undated) DEVICE — TBG INSUFL W FLTR STRL

## (undated) DEVICE — ENDOPATH 5MM ENDOSCOPIC BLUNT TIP DISSECTORS (12 POUCHES CONTAINING 3 DISSECTORS EACH): Brand: ENDOPATH

## (undated) DEVICE — APPL HEMOS FOR DELIVERY FLOSEAL

## (undated) DEVICE — GLV SURG EUDERMIC PF LTX 8 STRL

## (undated) DEVICE — BNDG ADHS CURAD FLX/FABRC 2X4IN STRL LF

## (undated) DEVICE — SUT VIC 3/0 CTI 36IN J944H